# Patient Record
Sex: FEMALE | Race: OTHER | NOT HISPANIC OR LATINO | URBAN - METROPOLITAN AREA
[De-identification: names, ages, dates, MRNs, and addresses within clinical notes are randomized per-mention and may not be internally consistent; named-entity substitution may affect disease eponyms.]

---
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
## 2022-03-23 ENCOUNTER — EMERGENCY (EMERGENCY)
Facility: HOSPITAL | Age: 22
LOS: 1 days | Discharge: HOME | End: 2022-03-23
Attending: EMERGENCY MEDICINE | Admitting: EMERGENCY MEDICINE
Payer: MEDICAID

## 2022-03-23 VITALS
DIASTOLIC BLOOD PRESSURE: 73 MMHG | HEIGHT: 68 IN | RESPIRATION RATE: 18 BRPM | TEMPERATURE: 98 F | SYSTOLIC BLOOD PRESSURE: 109 MMHG | OXYGEN SATURATION: 97 % | WEIGHT: 210.98 LBS | HEART RATE: 88 BPM

## 2022-03-23 DIAGNOSIS — Z98.890 OTHER SPECIFIED POSTPROCEDURAL STATES: Chronic | ICD-10-CM

## 2022-03-23 PROCEDURE — 99285 EMERGENCY DEPT VISIT HI MDM: CPT | Mod: 25

## 2022-03-23 PROCEDURE — 16020 DRESS/DEBRID P-THICK BURN S: CPT
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
## 2022-03-23 NOTE — ED ADULT NURSE NOTE - NSIMPLEMENTINTERV_GEN_ALL_ED
Implemented All Fall with Harm Risk Interventions:  [place] to call system. Call bell, personal items and telephone within reach. Instruct patient to call for assistance. Room bathroom lighting operational. Non-slip footwear when patient is off stretcher. Physically safe environment: no spills, clutter or unnecessary equipment. Stretcher in lowest position, wheels locked, appropriate side rails in place. Provide visual cue, wrist band, yellow gown, etc. Monitor gait and stability. Monitor for mental status changes and reorient to person, place, and time. Review medications for side effects contributing to fall risk. Reinforce activity limits and safety measures with patient and family. Provide visual clues: red socks.

## 2022-03-24 VITALS
SYSTOLIC BLOOD PRESSURE: 106 MMHG | TEMPERATURE: 98 F | RESPIRATION RATE: 16 BRPM | HEART RATE: 78 BPM | DIASTOLIC BLOOD PRESSURE: 62 MMHG | OXYGEN SATURATION: 100 %

## 2022-03-24 LAB
ALBUMIN SERPL ELPH-MCNC: 3.1 G/DL — LOW (ref 3.3–5)
ALP SERPL-CCNC: 68 U/L — SIGNIFICANT CHANGE UP (ref 40–120)
ALT FLD-CCNC: 19 U/L — SIGNIFICANT CHANGE UP (ref 12–78)
ANION GAP SERPL CALC-SCNC: 9 MMOL/L — SIGNIFICANT CHANGE UP (ref 5–17)
AST SERPL-CCNC: 13 U/L — LOW (ref 15–37)
BASOPHILS # BLD AUTO: 0.04 K/UL — SIGNIFICANT CHANGE UP (ref 0–0.2)
BASOPHILS NFR BLD AUTO: 0.4 % — SIGNIFICANT CHANGE UP (ref 0–2)
BILIRUB SERPL-MCNC: 0.2 MG/DL — SIGNIFICANT CHANGE UP (ref 0.2–1.2)
BUN SERPL-MCNC: 11 MG/DL — SIGNIFICANT CHANGE UP (ref 7–23)
CALCIUM SERPL-MCNC: 8.1 MG/DL — LOW (ref 8.5–10.1)
CHLORIDE SERPL-SCNC: 112 MMOL/L — HIGH (ref 96–108)
CK SERPL-CCNC: 152 U/L — SIGNIFICANT CHANGE UP (ref 26–192)
CO2 SERPL-SCNC: 20 MMOL/L — LOW (ref 22–31)
CREAT SERPL-MCNC: 0.75 MG/DL — SIGNIFICANT CHANGE UP (ref 0.5–1.3)
EGFR: 118 ML/MIN/1.73M2 — SIGNIFICANT CHANGE UP
EOSINOPHIL # BLD AUTO: 0.06 K/UL — SIGNIFICANT CHANGE UP (ref 0–0.5)
EOSINOPHIL NFR BLD AUTO: 0.6 % — SIGNIFICANT CHANGE UP (ref 0–6)
GLUCOSE SERPL-MCNC: 87 MG/DL — SIGNIFICANT CHANGE UP (ref 70–99)
HCT VFR BLD CALC: 34.2 % — LOW (ref 34.5–45)
HGB BLD-MCNC: 10.6 G/DL — LOW (ref 11.5–15.5)
IMM GRANULOCYTES NFR BLD AUTO: 0.5 % — SIGNIFICANT CHANGE UP (ref 0–1.5)
LYMPHOCYTES # BLD AUTO: 2.44 K/UL — SIGNIFICANT CHANGE UP (ref 1–3.3)
LYMPHOCYTES # BLD AUTO: 22.6 % — SIGNIFICANT CHANGE UP (ref 13–44)
MCHC RBC-ENTMCNC: 24.1 PG — LOW (ref 27–34)
MCHC RBC-ENTMCNC: 31 GM/DL — LOW (ref 32–36)
MCV RBC AUTO: 77.7 FL — LOW (ref 80–100)
MONOCYTES # BLD AUTO: 1.11 K/UL — HIGH (ref 0–0.9)
MONOCYTES NFR BLD AUTO: 10.3 % — SIGNIFICANT CHANGE UP (ref 2–14)
NEUTROPHILS # BLD AUTO: 7.1 K/UL — SIGNIFICANT CHANGE UP (ref 1.8–7.4)
NEUTROPHILS NFR BLD AUTO: 65.6 % — SIGNIFICANT CHANGE UP (ref 43–77)
NRBC # BLD: 0 /100 WBCS — SIGNIFICANT CHANGE UP (ref 0–0)
PLATELET # BLD AUTO: 242 K/UL — SIGNIFICANT CHANGE UP (ref 150–400)
POTASSIUM SERPL-MCNC: 3.4 MMOL/L — LOW (ref 3.5–5.3)
POTASSIUM SERPL-SCNC: 3.4 MMOL/L — LOW (ref 3.5–5.3)
PROT SERPL-MCNC: 7.4 G/DL — SIGNIFICANT CHANGE UP (ref 6–8.3)
RBC # BLD: 4.4 M/UL — SIGNIFICANT CHANGE UP (ref 3.8–5.2)
RBC # FLD: 15.9 % — HIGH (ref 10.3–14.5)
SODIUM SERPL-SCNC: 141 MMOL/L — SIGNIFICANT CHANGE UP (ref 135–145)
WBC # BLD: 10.8 K/UL — HIGH (ref 3.8–10.5)
WBC # FLD AUTO: 10.8 K/UL — HIGH (ref 3.8–10.5)

## 2022-03-24 PROCEDURE — 70450 CT HEAD/BRAIN W/O DYE: CPT | Mod: ME

## 2022-03-24 PROCEDURE — 82550 ASSAY OF CK (CPK): CPT

## 2022-03-24 PROCEDURE — 85025 COMPLETE CBC W/AUTO DIFF WBC: CPT

## 2022-03-24 PROCEDURE — 99285 EMERGENCY DEPT VISIT HI MDM: CPT | Mod: 25

## 2022-03-24 PROCEDURE — G1004: CPT

## 2022-03-24 PROCEDURE — 96374 THER/PROPH/DIAG INJ IV PUSH: CPT

## 2022-03-24 PROCEDURE — 36415 COLL VENOUS BLD VENIPUNCTURE: CPT

## 2022-03-24 PROCEDURE — 70450 CT HEAD/BRAIN W/O DYE: CPT | Mod: 26,ME

## 2022-03-24 PROCEDURE — 80053 COMPREHEN METABOLIC PANEL: CPT

## 2022-03-24 RX ORDER — SODIUM CHLORIDE 9 MG/ML
1000 INJECTION INTRAMUSCULAR; INTRAVENOUS; SUBCUTANEOUS ONCE
Refills: 0 | Status: COMPLETED | OUTPATIENT
Start: 2022-03-24 | End: 2022-03-24

## 2022-03-24 RX ORDER — KETOROLAC TROMETHAMINE 30 MG/ML
30 SYRINGE (ML) INJECTION ONCE
Refills: 0 | Status: DISCONTINUED | OUTPATIENT
Start: 2022-03-24 | End: 2022-03-24

## 2022-03-24 RX ADMIN — SODIUM CHLORIDE 1000 MILLILITER(S): 9 INJECTION INTRAMUSCULAR; INTRAVENOUS; SUBCUTANEOUS at 00:48

## 2022-03-24 RX ADMIN — Medication 30 MILLIGRAM(S): at 00:55
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
## 2022-03-24 NOTE — ED PROVIDER NOTE - NSFOLLOWUPCLINICS_GEN_ALL_ED_FT
[facility]-Cardiac [facility] Lower Level  Burn  [address] [place] [address]  Phone: [phone]  Fax:

## 2022-03-24 NOTE — ED PROVIDER NOTE - NSFOLLOWUPINSTRUCTIONS_ED_ALL_ED_FT
Second-Degree Burn, Adult       A second-degree burn, also called a partial thickness wound, is a serious injury that affects the first two layers of skin (epidermis and dermis). A second-degree burn may be minor or major, depending on the size and parts of the skin that are burned.      What are the causes?    This condition may be caused by:  •Heat, such as from a flame or hot liquid.      •Radiation, such as from sunlight or radiation treatments.      •Electricity. This can happen when electricity passes through the body, such as from lightning, electrical outlets, or power lines.      •Certain chemicals, such as acids that come into contact with the skin or eyes. Some chemicals can go through clothing.        What increases the risk?    The following factors may make you more likely to develop this condition:  •Being exposed to high-risk environments, such as those with open flames, chemicals, or electricity.      •Having cancer and being treated with radiation.        What are the signs or symptoms?    Symptoms of this condition include:  •Severe pain.      •Changes in the skin. The skin can be deep red, blistered, tender, swollen, blotchy, or shiny.        How is this diagnosed?    This condition is usually diagnosed with a physical exam. Your health care provider may remove any blistered skin during the exam.    It may take several days to diagnose the condition because this kind of burn can take time to develop. Watch the wound for changes at home. Visit a health care provider often to have your wound checked. If the wound is large, you may stay in the hospital so a health care team can examine the wound for a few days.      How is this treated?    Treatment depends on the severity and cause of the burn. Some second-degree burns, including major burns, electrical burns, and chemical burns, may need to be treated in a hospital. Treatment may include:  •Cooling the burn with cool, germ-free (sterile) water.    •Taking or applying medicines, such as:  •Medicines to relieve pain or itching.      •Ointments to treat or prevent infection.      •Antibiotic medicine to treat or prevent infection.        •Getting a tetanus shot.      •Covering the burn with a bandage (dressing).      •Applying pressure dressings to prevent scarring and to keep mobility in the burned part of the body.      •Removing dead skin. This is done by a health care provider. Do not try to remove dead skin yourself.      In deep and large wounds, treatment involves:  •Surgery to remove scabs.      •Being given fluids and nutrition.      •Close monitoring of blood flow near the wound.      •Oxygen given through a mask or a machine (ventilator).        Follow these instructions at home:    Medicines     •Take and apply over-the-counter and prescription medicines and ointments only as told by your health care provider.      •If you were prescribed an antibiotic medicine, take or apply it as told by your health care provider. Do not stop using the antibiotic even if you start to feel better.        Eating and drinking      •Drink enough fluid to keep your urine pale yellow.      •Eat a nutritious diet that is high in protein. This will help your wound heal.        Wound care    •Follow instructions from your health care provider about how to take care of your wound. Make sure you:  •Wash your hands with soap and water for at least 20 seconds before and after you change your dressing. If soap and water are not available, use hand .      •Change your dressing as told by your health care provider.        •If you have a compression dressing, wear it as told by your health care provider.    •Clean your wound 2 times a day, or as often as told by your health care provider.  •Wash the wound with mild soap and water.      •Rinse the wound with water to remove all soap.      •Pat the wound dry with a clean towel. Do not rub.      •Check your wound every day for signs of infection. Check for:  •More redness, swelling, or pain.      •Fluid or blood.      •Warmth.      •Pus or a bad smell.        • Do not scratch or pick at the wound.      • Do not break any blisters or peel any skin.      •Avoid exposing your wound to the sun.      General instructions     •If possible, raise (elevate) the injured area above the level of your heart while you are sitting or lying down.      •Rest as told by your health care provider. Do not exercise until your health care provider approves.      • Do not take baths, swim, use a hot tub, or do anything that would put your burn underwater until your health care provider approves. Ask your health care provider if you may take showers. You may only be allowed to take sponge baths.    • Do not put ice on your burn. This can cause more damage. Try cooling the burn with:  •Cool water.      •A cold, wet cloth (cold compress).        •Do range-of-motion movements if told by your health care provider.      • Do not use any products that contain nicotine or tobacco, such as cigarettes, e-cigarettes, and chewing tobacco. These can delay healing. If you need help quitting, ask your health care provider.      •Keep all follow-up visits as told by your health care provider. This is important.        How is this prevented?    •Make sure your water heaters are set to 120°F (49°C) or lower.      •Make sure you know how to get out of your home in case of a fire.      •Install smoke alarms in your home. Check them regularly to make sure they are working.        Contact a health care provider if:    •Your symptoms do not improve with treatment.      •Your pain is not relieved with medicine.      •You have more redness, swelling, or pain around your wound.      •You have fluid, blood, pus, or a bad smell coming from the wound.      •Your wound feels warm to the touch.      •You have a fever or chills.        Get help right away if:    •You develop red streaks near the wound.      •You develop severe pain.        Summary    •A second-degree burn is a serious injury that affects the first two layers of skin.      •Clean your wound 2 times a day or as often as told. Check your wound every day for signs of infection.      • Do not scratch or pick at your wound, break blisters, peel skin, or put ice on your burn.      This information is not intended to replace advice given to you by your health care provider. Make sure you discuss any questions you have with your health care provider.      Document Revised: 10/21/2020 Document Reviewed: 10/21/2020

## 2022-03-24 NOTE — ED PROVIDER NOTE - CROS ED NEURO ALL NEG
Called patient regarding plan of care now that urology workup has been completed. Patient had been seen by me in the clinic regarding genitourinary bleeding. In the clinic visit, patient was quite sure that the bleeding was with urination and not vaginally. However pelvic ultrasound had been obtained and they were unable to see endometrial stripe. Urological workup did show a large left nonobstructing stone (12x7mm). She has an upcoming appointment with Dr. Talavera to discuss management options.    We discussed that it's possible the bleeding she saw is from this stone. I again asked if she thought the bleeding was coming from her urine and patient confirms this. Discussed that if the bleeding is coming from her urine, no further gynecological workup is necessary. However, if it's possible the bleeding is coming from her vagina, I would recommend an endometrial biopsy as one of the causes of postmenopausal bleeding is endometrial cancer. Pelvic exam performed in the office was very challenging and an endometrial biopsy would need to be done under anesthesia.  Patient expressed understanding. At this point, she is unsure how she would like to proceed. Her preference would be to not do a biopsy as again she is reasonably sure the blood was from her urine. She would like to check with her insurance company to see if an endometrial biopsy under anesthesia would be covered. Procedure name and CPT code provided.  We also did discuss the possibility that if she would be put under anesthesia for ureteroscopy and she would like an endometrial biopsy, potentially these could be done simultaneously so she would only have to be put under anesthesia once. Patient will consider this option.    For now, patient will continue to weigh her options and will let us know what she would like to do. She does have the phone number for our clinic and I encouraged her to call us if any questions arise.    Roxanne Venegas, DO     - - -
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
## 2022-03-24 NOTE — ED PROVIDER NOTE - PATIENT PORTAL LINK FT
You can access the FollowMyHealth Patient Portal offered by [facility] by registering at the following website: [email or web address]. By joining [employer]’s FollowMyHealth portal, you will also be able to view your health information using other applications (apps) compatible with our system.

## 2022-06-18 ENCOUNTER — EMERGENCY (EMERGENCY)
Facility: HOSPITAL | Age: 22
LOS: 1 days | Discharge: ANOTHER HOSPITAL | End: 2022-06-18
Attending: EMERGENCY MEDICINE | Admitting: EMERGENCY MEDICINE
Payer: MEDICAID

## 2022-06-18 VITALS
TEMPERATURE: 98 F | HEIGHT: 68 IN | HEART RATE: 103 BPM | SYSTOLIC BLOOD PRESSURE: 95 MMHG | DIASTOLIC BLOOD PRESSURE: 69 MMHG | RESPIRATION RATE: 18 BRPM | OXYGEN SATURATION: 97 %

## 2022-06-18 VITALS
DIASTOLIC BLOOD PRESSURE: 64 MMHG | RESPIRATION RATE: 18 BRPM | OXYGEN SATURATION: 99 % | TEMPERATURE: 98 F | SYSTOLIC BLOOD PRESSURE: 118 MMHG | HEART RATE: 69 BPM

## 2022-06-18 DIAGNOSIS — Z98.890 OTHER SPECIFIED POSTPROCEDURAL STATES: Chronic | ICD-10-CM

## 2022-06-18 LAB
ALBUMIN SERPL ELPH-MCNC: 3.4 G/DL — SIGNIFICANT CHANGE UP (ref 3.3–5)
ALP SERPL-CCNC: 69 U/L — SIGNIFICANT CHANGE UP (ref 40–120)
ALT FLD-CCNC: 15 U/L — SIGNIFICANT CHANGE UP (ref 12–78)
ANION GAP SERPL CALC-SCNC: 6 MMOL/L — SIGNIFICANT CHANGE UP (ref 5–17)
AST SERPL-CCNC: 11 U/L — LOW (ref 15–37)
BASOPHILS # BLD AUTO: 0.05 K/UL — SIGNIFICANT CHANGE UP (ref 0–0.2)
BASOPHILS NFR BLD AUTO: 0.6 % — SIGNIFICANT CHANGE UP (ref 0–2)
BILIRUB SERPL-MCNC: 0.2 MG/DL — SIGNIFICANT CHANGE UP (ref 0.2–1.2)
BUN SERPL-MCNC: 13 MG/DL — SIGNIFICANT CHANGE UP (ref 7–23)
CALCIUM SERPL-MCNC: 8.7 MG/DL — SIGNIFICANT CHANGE UP (ref 8.5–10.1)
CHLORIDE SERPL-SCNC: 110 MMOL/L — HIGH (ref 96–108)
CK SERPL-CCNC: 172 U/L — SIGNIFICANT CHANGE UP (ref 26–192)
CO2 SERPL-SCNC: 24 MMOL/L — SIGNIFICANT CHANGE UP (ref 22–31)
CREAT SERPL-MCNC: 0.72 MG/DL — SIGNIFICANT CHANGE UP (ref 0.5–1.3)
EGFR: 123 ML/MIN/1.73M2 — SIGNIFICANT CHANGE UP
EOSINOPHIL # BLD AUTO: 0.07 K/UL — SIGNIFICANT CHANGE UP (ref 0–0.5)
EOSINOPHIL NFR BLD AUTO: 0.8 % — SIGNIFICANT CHANGE UP (ref 0–6)
FLUAV AG NPH QL: SIGNIFICANT CHANGE UP
FLUBV AG NPH QL: SIGNIFICANT CHANGE UP
GLUCOSE SERPL-MCNC: 95 MG/DL — SIGNIFICANT CHANGE UP (ref 70–99)
HCT VFR BLD CALC: 35.4 % — SIGNIFICANT CHANGE UP (ref 34.5–45)
HGB BLD-MCNC: 10.5 G/DL — LOW (ref 11.5–15.5)
IMM GRANULOCYTES NFR BLD AUTO: 0.3 % — SIGNIFICANT CHANGE UP (ref 0–1.5)
LIDOCAIN IGE QN: 132 U/L — SIGNIFICANT CHANGE UP (ref 73–393)
LYMPHOCYTES # BLD AUTO: 2.77 K/UL — SIGNIFICANT CHANGE UP (ref 1–3.3)
LYMPHOCYTES # BLD AUTO: 30.5 % — SIGNIFICANT CHANGE UP (ref 13–44)
MAGNESIUM SERPL-MCNC: 2.4 MG/DL — SIGNIFICANT CHANGE UP (ref 1.6–2.6)
MCHC RBC-ENTMCNC: 24.4 PG — LOW (ref 27–34)
MCHC RBC-ENTMCNC: 29.7 GM/DL — LOW (ref 32–36)
MCV RBC AUTO: 82.3 FL — SIGNIFICANT CHANGE UP (ref 80–100)
MONOCYTES # BLD AUTO: 0.74 K/UL — SIGNIFICANT CHANGE UP (ref 0–0.9)
MONOCYTES NFR BLD AUTO: 8.2 % — SIGNIFICANT CHANGE UP (ref 2–14)
NEUTROPHILS # BLD AUTO: 5.41 K/UL — SIGNIFICANT CHANGE UP (ref 1.8–7.4)
NEUTROPHILS NFR BLD AUTO: 59.6 % — SIGNIFICANT CHANGE UP (ref 43–77)
NRBC # BLD: 0 /100 WBCS — SIGNIFICANT CHANGE UP (ref 0–0)
PHOSPHATE SERPL-MCNC: 2.8 MG/DL — SIGNIFICANT CHANGE UP (ref 2.5–4.5)
PLATELET # BLD AUTO: 318 K/UL — SIGNIFICANT CHANGE UP (ref 150–400)
POTASSIUM SERPL-MCNC: 4 MMOL/L — SIGNIFICANT CHANGE UP (ref 3.5–5.3)
POTASSIUM SERPL-SCNC: 4 MMOL/L — SIGNIFICANT CHANGE UP (ref 3.5–5.3)
PROT SERPL-MCNC: 8.1 G/DL — SIGNIFICANT CHANGE UP (ref 6–8.3)
RBC # BLD: 4.3 M/UL — SIGNIFICANT CHANGE UP (ref 3.8–5.2)
RBC # FLD: 14.4 % — SIGNIFICANT CHANGE UP (ref 10.3–14.5)
RSV RNA NPH QL NAA+NON-PROBE: SIGNIFICANT CHANGE UP
SARS-COV-2 RNA SPEC QL NAA+PROBE: SIGNIFICANT CHANGE UP
SODIUM SERPL-SCNC: 140 MMOL/L — SIGNIFICANT CHANGE UP (ref 135–145)
WBC # BLD: 9.07 K/UL — SIGNIFICANT CHANGE UP (ref 3.8–10.5)
WBC # FLD AUTO: 9.07 K/UL — SIGNIFICANT CHANGE UP (ref 3.8–10.5)

## 2022-06-18 PROCEDURE — 96376 TX/PRO/DX INJ SAME DRUG ADON: CPT

## 2022-06-18 PROCEDURE — 83690 ASSAY OF LIPASE: CPT

## 2022-06-18 PROCEDURE — 87637 SARSCOV2&INF A&B&RSV AMP PRB: CPT

## 2022-06-18 PROCEDURE — 82550 ASSAY OF CK (CPK): CPT

## 2022-06-18 PROCEDURE — 80203 DRUG SCREEN QUANT ZONISAMIDE: CPT

## 2022-06-18 PROCEDURE — 96374 THER/PROPH/DIAG INJ IV PUSH: CPT

## 2022-06-18 PROCEDURE — 96375 TX/PRO/DX INJ NEW DRUG ADDON: CPT

## 2022-06-18 PROCEDURE — 83735 ASSAY OF MAGNESIUM: CPT

## 2022-06-18 PROCEDURE — C9254: CPT

## 2022-06-18 PROCEDURE — 36415 COLL VENOUS BLD VENIPUNCTURE: CPT

## 2022-06-18 PROCEDURE — 84100 ASSAY OF PHOSPHORUS: CPT

## 2022-06-18 PROCEDURE — 99291 CRITICAL CARE FIRST HOUR: CPT

## 2022-06-18 PROCEDURE — 93005 ELECTROCARDIOGRAM TRACING: CPT

## 2022-06-18 PROCEDURE — 85025 COMPLETE CBC W/AUTO DIFF WBC: CPT

## 2022-06-18 PROCEDURE — 80177 DRUG SCRN QUAN LEVETIRACETAM: CPT

## 2022-06-18 PROCEDURE — 80053 COMPREHEN METABOLIC PANEL: CPT

## 2022-06-18 PROCEDURE — 93010 ELECTROCARDIOGRAM REPORT: CPT

## 2022-06-18 PROCEDURE — 99285 EMERGENCY DEPT VISIT HI MDM: CPT | Mod: 25

## 2022-06-18 RX ORDER — ZONISAMIDE 100 MG
300 CAPSULE ORAL ONCE
Refills: 0 | Status: COMPLETED | OUTPATIENT
Start: 2022-06-18 | End: 2022-06-18

## 2022-06-18 RX ORDER — LACOSAMIDE 50 MG/1
100 TABLET ORAL ONCE
Refills: 0 | Status: DISCONTINUED | OUTPATIENT
Start: 2022-06-18 | End: 2022-06-18

## 2022-06-18 RX ORDER — SODIUM CHLORIDE 9 MG/ML
1000 INJECTION INTRAMUSCULAR; INTRAVENOUS; SUBCUTANEOUS ONCE
Refills: 0 | Status: COMPLETED | OUTPATIENT
Start: 2022-06-18 | End: 2022-06-18

## 2022-06-18 RX ORDER — CLONAZEPAM 1 MG
2 TABLET ORAL ONCE
Refills: 0 | Status: DISCONTINUED | OUTPATIENT
Start: 2022-06-18 | End: 2022-06-18

## 2022-06-18 RX ORDER — LEVETIRACETAM 250 MG/1
1000 TABLET, FILM COATED ORAL ONCE
Refills: 0 | Status: DISCONTINUED | OUTPATIENT
Start: 2022-06-18 | End: 2022-06-18

## 2022-06-18 RX ORDER — DIAZEPAM 5 MG
5 TABLET ORAL ONCE
Refills: 0 | Status: DISCONTINUED | OUTPATIENT
Start: 2022-06-18 | End: 2022-06-18

## 2022-06-18 RX ORDER — LEVETIRACETAM 250 MG/1
1000 TABLET, FILM COATED ORAL ONCE
Refills: 0 | Status: COMPLETED | OUTPATIENT
Start: 2022-06-18 | End: 2022-06-18

## 2022-06-18 RX ORDER — ONDANSETRON 8 MG/1
4 TABLET, FILM COATED ORAL ONCE
Refills: 0 | Status: COMPLETED | OUTPATIENT
Start: 2022-06-18 | End: 2022-06-18

## 2022-06-18 RX ORDER — CLONAZEPAM 1 MG
1.5 TABLET ORAL ONCE
Refills: 0 | Status: DISCONTINUED | OUTPATIENT
Start: 2022-06-18 | End: 2022-06-18

## 2022-06-18 RX ADMIN — LEVETIRACETAM 440 MILLIGRAM(S): 250 TABLET, FILM COATED ORAL at 17:10

## 2022-06-18 RX ADMIN — ONDANSETRON 4 MILLIGRAM(S): 8 TABLET, FILM COATED ORAL at 16:53

## 2022-06-18 RX ADMIN — LEVETIRACETAM 1000 MILLIGRAM(S): 250 TABLET, FILM COATED ORAL at 16:55

## 2022-06-18 RX ADMIN — SODIUM CHLORIDE 1000 MILLILITER(S): 9 INJECTION INTRAMUSCULAR; INTRAVENOUS; SUBCUTANEOUS at 16:45

## 2022-06-18 RX ADMIN — Medication 2 MILLIGRAM(S): at 16:46

## 2022-06-18 RX ADMIN — Medication 5 MILLIGRAM(S): at 17:49

## 2022-06-18 RX ADMIN — LEVETIRACETAM 440 MILLIGRAM(S): 250 TABLET, FILM COATED ORAL at 17:49

## 2022-06-18 RX ADMIN — Medication 300 MILLIGRAM(S): at 18:21

## 2022-06-18 RX ADMIN — LACOSAMIDE 120 MILLIGRAM(S): 50 TABLET ORAL at 21:37

## 2022-06-18 RX ADMIN — Medication 2 MILLIGRAM(S): at 22:01

## 2022-06-18 RX ADMIN — Medication 2 MILLIGRAM(S): at 15:59

## 2022-06-18 RX ADMIN — Medication 2 MILLIGRAM(S): at 17:18

## 2022-06-18 RX ADMIN — Medication 2 MILLIGRAM(S): at 21:38

## 2022-06-18 RX ADMIN — LACOSAMIDE 120 MILLIGRAM(S): 50 TABLET ORAL at 18:42

## 2022-06-18 RX ADMIN — Medication 1.5 MILLIGRAM(S): at 18:03
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
## 2022-06-18 NOTE — ED PROVIDER NOTE - NSBENEFITOFTRANSFER_ED_A_ED
Obtain Level of Care/Service Not Available at this Facility/Worsening of Condition, Death, or Disability if Patient Does Not Transfer/Continuity of Care at Other Facility/Other:
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
## 2022-06-18 NOTE — ED PROVIDER NOTE - PROGRESS NOTE DETAILS
pt presenting with tonic clonic seizure activity and frothing at the mouth Ativan 2 mg given with resolution of seizure maintaining airway pt presenting with tonic clonic seizure activity and frothing at the mouth additional Ativan 2 mg given with resolution of seizure maintaining airway Due to recurrent seizure will load with Keppra 1 gm pt also reporting nausea will treat with Zofran awaiting labs As pt has had multiple episodes of tonic clonic seizure activity in the ED Valium ordered for longer acting control. Spoke with Neurology in house recommends additional Keppra 1 gm as pt would take this normally at 6 pm. Pt remains awake but lethargic maintaining airway Pt additional seizure medication given Pt will require admission due to recurrent seizure activity as there is no in house EEG would benefit from transfer at this time spoke with physician covering for Dr. Yanez in agreement with current plan can give additional Vimpat if needed although pt would need EKG pre and post administering. In agreement that pt should be admitted. Transfer center at [place] notified. Spoke with Dr. Duffy in the ED will accept pt family updated pt has additional seizure lasting less then 3 min, awake at this time. Pt 10 pm medications ordered. Was going to dose pt another 2 mg of Ativan but family would like to hold to await ambulance arrival. Will give additional Vimpat as per pt neuro recommendations. Repeat EKG ordered. Of note pt had c/o some numbness to her RLE which has since resolved. +pedal pulse

## 2022-06-21 LAB — ZONISAMIDE SERPL-MCNC: 40 UG/ML — SIGNIFICANT CHANGE UP (ref 10–40)

## 2022-06-24 LAB — LEVETIRACETAM SERPL-MCNC: 25.2 UG/ML — SIGNIFICANT CHANGE UP (ref 10–40)

## 2022-06-26 NOTE — ED ADULT NURSE REASSESSMENT NOTE - NS ED NURSE REASSESS COMMENT FT1
patient sitting up in stretcher bedside cardiac monitor intact, patient maintaining saturation, seizure precautions being maintained, no seizure activity noted at this time, IV site dry and intact no sings of infiltration noted at this time, report given to [place] receiving RN, patient condition provided, hx and medication adm, patient resting at this time will continue to monitor.
You can access the FollowMyHealth Patient Portal offered by  by registering at the following website: [email or web address]. By joining [employer]’s FollowMyHealth portal, you will also be able to view your health information using other applications (apps) compatible with our system.

## 2022-07-03 ENCOUNTER — EMERGENCY (EMERGENCY)
Facility: HOSPITAL | Age: 22
LOS: 1 days | Discharge: ANOTHER HOSPITAL | End: 2022-07-03
Attending: INTERNAL MEDICINE | Admitting: INTERNAL MEDICINE
Payer: MEDICAID

## 2022-07-03 VITALS
TEMPERATURE: 98 F | OXYGEN SATURATION: 99 % | RESPIRATION RATE: 16 BRPM | HEART RATE: 74 BPM | HEIGHT: 68 IN | SYSTOLIC BLOOD PRESSURE: 119 MMHG | DIASTOLIC BLOOD PRESSURE: 75 MMHG

## 2022-07-03 VITALS
TEMPERATURE: 98 F | HEART RATE: 75 BPM | SYSTOLIC BLOOD PRESSURE: 100 MMHG | DIASTOLIC BLOOD PRESSURE: 55 MMHG | OXYGEN SATURATION: 100 % | RESPIRATION RATE: 14 BRPM

## 2022-07-03 DIAGNOSIS — Z98.890 OTHER SPECIFIED POSTPROCEDURAL STATES: Chronic | ICD-10-CM

## 2022-07-03 LAB
ALBUMIN SERPL ELPH-MCNC: 3.2 G/DL — LOW (ref 3.3–5)
ALP SERPL-CCNC: 65 U/L — SIGNIFICANT CHANGE UP (ref 40–120)
ALT FLD-CCNC: 13 U/L — SIGNIFICANT CHANGE UP (ref 12–78)
ANION GAP SERPL CALC-SCNC: 5 MMOL/L — SIGNIFICANT CHANGE UP (ref 5–17)
AST SERPL-CCNC: 14 U/L — LOW (ref 15–37)
BILIRUB SERPL-MCNC: <0.1 MG/DL — LOW (ref 0.2–1.2)
BUN SERPL-MCNC: 16 MG/DL — SIGNIFICANT CHANGE UP (ref 7–23)
CALCIUM SERPL-MCNC: 8.3 MG/DL — LOW (ref 8.5–10.1)
CHLORIDE SERPL-SCNC: 116 MMOL/L — HIGH (ref 96–108)
CO2 SERPL-SCNC: 21 MMOL/L — LOW (ref 22–31)
CREAT SERPL-MCNC: 0.73 MG/DL — SIGNIFICANT CHANGE UP (ref 0.5–1.3)
EGFR: 121 ML/MIN/1.73M2 — SIGNIFICANT CHANGE UP
GLUCOSE SERPL-MCNC: 103 MG/DL — HIGH (ref 70–99)
HCT VFR BLD CALC: 34.4 % — LOW (ref 34.5–45)
HGB BLD-MCNC: 10.4 G/DL — LOW (ref 11.5–15.5)
MAGNESIUM SERPL-MCNC: 1.8 MG/DL — SIGNIFICANT CHANGE UP (ref 1.6–2.6)
MCHC RBC-ENTMCNC: 24.5 PG — LOW (ref 27–34)
MCHC RBC-ENTMCNC: 30.2 GM/DL — LOW (ref 32–36)
MCV RBC AUTO: 80.9 FL — SIGNIFICANT CHANGE UP (ref 80–100)
NRBC # BLD: 0 /100 WBCS — SIGNIFICANT CHANGE UP (ref 0–0)
PHOSPHATE SERPL-MCNC: 3.1 MG/DL — SIGNIFICANT CHANGE UP (ref 2.5–4.5)
PLATELET # BLD AUTO: 327 K/UL — SIGNIFICANT CHANGE UP (ref 150–400)
POTASSIUM SERPL-MCNC: 3.8 MMOL/L — SIGNIFICANT CHANGE UP (ref 3.5–5.3)
POTASSIUM SERPL-SCNC: 3.8 MMOL/L — SIGNIFICANT CHANGE UP (ref 3.5–5.3)
PROT SERPL-MCNC: 7.9 G/DL — SIGNIFICANT CHANGE UP (ref 6–8.3)
RBC # BLD: 4.25 M/UL — SIGNIFICANT CHANGE UP (ref 3.8–5.2)
RBC # FLD: 15 % — HIGH (ref 10.3–14.5)
SARS-COV-2 RNA SPEC QL NAA+PROBE: SIGNIFICANT CHANGE UP
SODIUM SERPL-SCNC: 142 MMOL/L — SIGNIFICANT CHANGE UP (ref 135–145)
WBC # BLD: 10.18 K/UL — SIGNIFICANT CHANGE UP (ref 3.8–10.5)
WBC # FLD AUTO: 10.18 K/UL — SIGNIFICANT CHANGE UP (ref 3.8–10.5)

## 2022-07-03 PROCEDURE — 93005 ELECTROCARDIOGRAM TRACING: CPT

## 2022-07-03 PROCEDURE — 93010 ELECTROCARDIOGRAM REPORT: CPT

## 2022-07-03 PROCEDURE — 85027 COMPLETE CBC AUTOMATED: CPT

## 2022-07-03 PROCEDURE — 80053 COMPREHEN METABOLIC PANEL: CPT

## 2022-07-03 PROCEDURE — 87635 SARS-COV-2 COVID-19 AMP PRB: CPT

## 2022-07-03 PROCEDURE — 84100 ASSAY OF PHOSPHORUS: CPT

## 2022-07-03 PROCEDURE — 99291 CRITICAL CARE FIRST HOUR: CPT

## 2022-07-03 PROCEDURE — 80177 DRUG SCRN QUAN LEVETIRACETAM: CPT

## 2022-07-03 PROCEDURE — 99285 EMERGENCY DEPT VISIT HI MDM: CPT | Mod: 25

## 2022-07-03 PROCEDURE — 83735 ASSAY OF MAGNESIUM: CPT

## 2022-07-03 PROCEDURE — 36415 COLL VENOUS BLD VENIPUNCTURE: CPT

## 2022-07-03 RX ORDER — LEVETIRACETAM 250 MG/1
1000 TABLET, FILM COATED ORAL ONCE
Refills: 0 | Status: DISCONTINUED | OUTPATIENT
Start: 2022-07-03 | End: 2022-07-03

## 2022-07-03 RX ORDER — SODIUM CHLORIDE 9 MG/ML
1000 INJECTION INTRAMUSCULAR; INTRAVENOUS; SUBCUTANEOUS ONCE
Refills: 0 | Status: COMPLETED | OUTPATIENT
Start: 2022-07-03 | End: 2022-07-03

## 2022-07-03 RX ADMIN — Medication 1 MILLIGRAM(S): at 01:53

## 2022-07-03 RX ADMIN — LEVETIRACETAM 1000 MILLIGRAM(S): 250 TABLET, FILM COATED ORAL at 02:20

## 2022-07-03 RX ADMIN — SODIUM CHLORIDE 1000 MILLILITER(S): 9 INJECTION INTRAMUSCULAR; INTRAVENOUS; SUBCUTANEOUS at 01:11

## 2022-07-03 RX ADMIN — Medication 1 MILLIGRAM(S): at 02:21

## 2022-07-03 NOTE — ED PROVIDER NOTE - OBJECTIVE STATEMENT
per ems from home with 9 seizures today per family, lasting about 30 seconds to 1 minute each, hx of seizures, fs 98 by ems  seizures per ems from home with 9 seizures today per family, lasting about 30 seconds to 1 minute each, hx of seizures, fs 98 by ems  seizures  generalized seizure in the ED at 1:45 Rx ativan and keppra 1000mg 22 year old female H/O seizure disorder per ems from home with 9 seizures today per family, lasting about 30 seconds to 1 minute each, hx of seizures, fs 98   In the ED she had a prolonged generalized seizure  at 1:45 am that lasted a few minutes and eventually broke with  Rx IV  ativan 1 mg,  Keppra 1000mg and a second ativan 1mg. She was post ictal. after transfer and discussion with Gunnar Mcgee MD at [facility] the patient was A/O x 3 and back to base line. She is scheduled at [facility] on Jul 11 for EEG

## 2022-07-03 NOTE — ED PROVIDER NOTE - PROGRESS NOTE DETAILS
transfer to [place] initiated DW with Gunnar Mcgee at [facility] the patients grandmother and mother   DW with Gunnar Mcgee at [facility]

## 2022-07-03 NOTE — ED PROVIDER NOTE - CLINICAL SUMMARY MEDICAL DECISION MAKING FREE TEXT BOX
status epilepticus  treated with IV ativan, IV keppra, discussed with Dr Gunnar Mcgee and transferred to [facility]

## 2022-07-03 NOTE — ED ADULT NURSE REASSESSMENT NOTE - NS ED NURSE REASSESS COMMENT FT1
pt had 3 seizures while in the dept.  pt given ativan IVP x2 and IV keppra 1000mg.  pt reports feeling better, no  longer post ictal.  pt awake and talking.  mother at bedside.  pt assisted oob to bedside commode without incident.  awaiting transfer to [facility].

## 2022-07-03 NOTE — ED ADULT NURSE NOTE - NSIMPLEMENTINTERV_GEN_ALL_ED
Implemented All Fall Risk Interventions:  [place] to call system. Call bell, personal items and telephone within reach. Instruct patient to call for assistance. Room bathroom lighting operational. Non-slip footwear when patient is off stretcher. Physically safe environment: no spills, clutter or unnecessary equipment. Stretcher in lowest position, wheels locked, appropriate side rails in place. Provide visual cue, wrist band, yellow gown, etc. Monitor gait and stability. Monitor for mental status changes and reorient to person, place, and time. Review medications for side effects contributing to fall risk. Reinforce activity limits and safety measures with patient and family.
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
## 2022-07-07 LAB — LEVETIRACETAM SERPL-MCNC: 33.2 UG/ML — SIGNIFICANT CHANGE UP (ref 10–40)

## 2022-07-26 RX ORDER — ZONISAMIDE 100 MG
3 CAPSULE ORAL
Qty: 0 | Refills: 0 | DISCHARGE

## 2022-07-26 RX ORDER — FOLIC ACID 0.8 MG
1 TABLET ORAL
Qty: 0 | Refills: 0 | DISCHARGE

## 2022-07-26 RX ORDER — LACOSAMIDE 50 MG/1
1 TABLET ORAL
Qty: 0 | Refills: 0 | DISCHARGE

## 2022-07-26 RX ORDER — LEVETIRACETAM 250 MG/1
0 TABLET, FILM COATED ORAL
Qty: 0 | Refills: 0 | DISCHARGE

## 2022-07-26 RX ORDER — PERAMPANEL 2 MG/1
1 TABLET ORAL
Qty: 0 | Refills: 0 | DISCHARGE

## 2022-07-26 RX ORDER — CLONAZEPAM 1 MG
0 TABLET ORAL
Qty: 0 | Refills: 0 | DISCHARGE

## 2022-07-27 PROBLEM — G40.909 EPILEPSY, UNSPECIFIED, NOT INTRACTABLE, WITHOUT STATUS EPILEPTICUS: Chronic | Status: ACTIVE | Noted: 2017-07-09

## 2022-11-11 NOTE — ED ADULT NURSE NOTE - BEFAST LAST WELL KNOWN
[facility] PROGRESS NOTE      Patient:  Amy Lion Date of Service:  11/11/2022   YOB: 1956 Admission Date:  11/4/2022   MRN:  [id number] Attending:  Isaiah Luna MD   PCP:  [facility] Pcp   Hospital Day:  Hospital Day: 8     PRIMARY CARDIOLOGIST:  Alejandra    CHIEF COMPLAINT: UTI, NSTEMI      HISTORY OF PRESENT ILLNESS:   Amy Lion is a 66 year old female with history of HFpEF, morbid obesity, chronic debility, PVD, HTN, T2DM, depression, HTN, and HLD who was transferred to [facility] from [place] for possible coronary angiogram after trop were found to be elevated. Pt reportedly presented to OSH on 11/3/22 for confusion and fall from her nursing facility. She was found to have sepsis from UTI. Trop level were also noted to be elevated and thus she was transferred to [facility] for further eval.     Pt was seen at bedside. She appeared sleepy but was able to answer my questions. She reports feeling weak but denies CP, SOB, abd pain, N/V/D, fever, or chills. She is bed-ridden for the majority of the time and ambulates minimally using a walker. Denies any cardiac history but admits to family hx of heart disease in parents at old age.     Interval Hx:  Patient diuresing on lasix gtt. Net I/O since admission -11.1 L, -1.2 L in the pats 24 hours. Bed weights 415->387 lbs, awaiting today's weight. Creatinine 0.72, K 3.5, Bicarb 33, Na 140. Hgb 10.2. SBP 130s. Saturating well on RA.    Today, patient reports feeling okay. Reports breathing is okay. Denies chest pain. Denies pain in legs.    CURRENT AND PREVIOUS CARDIAC STUDIES:     EKG:   Date:  11/3/22      Echo 11/4/22  Final Impressions:  Limited echocardiogram.  Preserved systolic function with mild distal septal lateral wall hypokinesis.  EF 54%.  Moderate increased left ventricular wall thickness.  Grade I diastolic dysfunction of the left ventricle (impaired relaxation pattern).  Calcified AV without significant stenosis.  Thickened mitral  valve.  Dilated IVC.  No pericardial effusion    Echo 9/27/2022  Normal left ventricular size and systolic function, EF 60 %.  Grade I diastolic dysfunction of the left ventricle (impaired relaxation pattern).  Normal right ventricular size and systolic function.  Mild pulmonary hypertension; RVSP 40 mmHg.  Mild aortic valve stenosis; mean gradient 12 mmHg, KEKE 1.5 cm2.  Normal IVC dimension with >50% respiratory change of the inferior vena cava.  No pericardial effusion.    REVIEW OF SYSTEMS:   See HPI    PAST HISTORY:     PAST MEDICAL HISTORY:   Past Medical History:   Diagnosis Date   • Anxiety Disorder    • Bacteremia    • Cellulitis 09/24/2022    abd   • Chronic back pain    • Congestive cardiac failure (CMS/[facility])    • CVA (cerebral infarction)    • Depression    • Diabetes Mellitus    • Erythema intertrigo    • Gastroesophageal reflux disease    • Glaucoma     severe left eye   • Herpes zoster    • Hyperlipidemia    • Hypertension    • Hypovolemia 09/24/2022   • Left ulnar fracture    • Lymphedema of both lower extremities 02/04/2020   • Morbid (severe) obesity due to excess calories (CMS/[facility])    • Neuropathy     feet   • OA (osteoarthritis) of hip    • Obesity    • Polyneuropathy    • PVD (peripheral vascular disease) (CMS/[facility])    • unspecified dementia      Patient Active Problem List    Diagnosis Date Noted   • UTI (urinary tract infection) 11/04/2022     Priority: Low   • NSTEMI (non-ST elevated myocardial infarction) (CMS/[facility]) 11/04/2022     Priority: Low   • Severe sepsis (CMS/[facility]) 11/03/2022     Priority: Low   • Cellulitis of other specified site 09/24/2022     Priority: Low   • Cellulitis 09/24/2022     Priority: Low   • Microalbuminuria 02/24/2022     Priority: Low   • History of methicillin resistant staphylococcus aureus (MRSA) 02/24/2022     Priority: Low   • Dementia without behavioral disturbance (CMS/[facility]) 01/28/2022     Priority: Low   • Impaired mobility and activities of daily living  01/28/2022     Priority: Low   • Resides in long term care facility 01/30/2021     Priority: Low   • PAD (peripheral artery disease) (CMS/HCC) 02/04/2020     Priority: Low   • Lymphedema of both lower extremities 02/04/2020     Priority: Low   • Diabetic retinopathy of both eyes associated with type 2 diabetes mellitus (CMS/HCC) 09/05/2019     Priority: Low   • Gastroesophageal reflux disease without esophagitis 03/02/2018     Priority: Low   • Neovascular glaucoma of left eye, severe stage 11/20/2015     Priority: Low   • History of cardioembolic cerebrovascular accident (CVA) 09/23/2015     Priority: Low   • Essential hypertension      Priority: Low   • Dyslipidemia      Priority: Low   • BMI 60.0-69.9, adult (CMS/HCC)      Priority: Low   • Generalized OA 05/07/2013     Priority: Low     Left knee shoulders     • Type 2 diabetes mellitus with diabetic polyneuropathy, with long-term current use of insulin (CMS/HCC) 05/07/2013     Priority: Low       HOME MEDICATIONS:  Medications Prior to Admission   Medication Sig Dispense Refill   • divalproex (DEPAKOTE SPRINKLE) 125 MG sprinkle Take 125 mg by mouth in the morning and 125 mg at noon and 125 mg in the evening.     • DISPENSE Eucerin (White petrolatum-mineral oil) cream Apply thin layer topical every shift PRN     • acetaminophen (TYLENOL) 650 MG CR tablet Take 650 mg by mouth every 8 hours as needed for Pain.     • Acidophilus Lactobacillus Cap Take 2 capsules by mouth daily.     • albuterol 108 (90 Base) MCG/ACT inhaler Inhale 2 puffs into the lungs every 4 hours as needed for Shortness of Breath or Wheezing.     • lidocaine (LIDOCARE) 4 % patch Place 1 patch onto the skin every 12 hours as needed for Pain.     • clopidogrel (Plavix) 75 MG tablet Take 1 tablet by mouth daily. 30 tablet 0   • acetaminophen (TYLENOL) 650 MG suppository Place 650 mg rectally every 4 hours as needed for Fever.     • lidocaine (LIDOCARE) 4 % patch Place 1 patch onto the skin every 24  hours as needed for Pain.     • Menthol, Topical Analgesic, (Biofreeze) 4 % Gel Apply topically 2 times daily as needed (for pain).     • docusate sodium (COLACE) 100 MG capsule Take 100 mg by mouth daily.     • gabapentin (NEURONTIN) 100 MG capsule Take 100 mg by mouth in the morning and 100 mg in the evening.     • GLUCAGON HCL, RDNA, IJ Inject 1 mg as directed 1 time as needed (for hypoglycemia).     • dextrose (GLUTOSE) 40 % Gel Take 1 Tube by mouth 1 time as needed for Hypoglycemia.     • aluminum-magnesium hydroxide-simethicone (MAALOX) 200-200-20 MG/5ML Suspension Take 15 mLs by mouth every 4 hours as needed (for indigestion).     • miconazole 2 % topical powder Apply 1 application topically 2 times daily as needed for Itching.     • Multiple Vitamins-Iron (Multivitamin Plus Iron Adult) Tab Take 1 tablet by mouth daily.     • Benzocaine (ORAJEL MT) Take 1 application by mouth 4 times daily as needed (for pain). (benzocaine-menthol-zinc-chlor) 20-0.26-0.15% gel     • docusate sodium-sennosides (SENOKOT S) 50-8.6 MG per tablet Take 1 tablet by mouth daily.     • acetaminophen (TYLENOL) 650 MG CR tablet Take 650 mg by mouth in the morning and 650 mg at noon and 650 mg in the evening.     • insulin glargine (LANTUS) 100 UNIT/ML vial solution Inject 40 units subcutaneously in the morning and 62 units in the evening     • metFORMIN (GLUCOPHAGE-XR) 750 MG 24 hr tablet Take 750 mg by mouth daily (with breakfast). Indications: Type 2 Diabetes     • acetaminophen (TYLENOL) 325 MG tablet Take 650 mg by mouth every 4 hours as needed for Pain or Fever.     • bisacodyl (DULCOLAX) 10 MG suppository Place 10 mg rectally daily as needed for Constipation.     • insulin lispro (HUMALOG) 100 UNIT/ML sliding scale injection Less than 150 Give No Insulin 150-200 Give 6 units insulin, 201-250 Give 8 units insulin, 251-300 Give 10 units insulin SubQ 301-350 Give 12 units insulin SubQ 351-400 Give 14 units insulin SubQ Greater than  400. Max: 56 units 10 mL 0   • amLODIPine (NORVASC) 5 MG tablet Take 1 tablet by mouth daily. Do not start before May 27, 2020. 30 tablet 0   • atorvastatin (LIPITOR) 80 MG tablet Take 1 tablet by mouth once daily (Patient taking differently: Take 80 mg by mouth nightly.) 90 tablet 0   • famotidine (PEPCID) 20 MG tablet Take 1 tablet by mouth daily. 90 tablet 3   • aspirin 81 MG tablet Take 81 mg by mouth daily.         ALLERGIES  ALLERGIES:   Allergen Reactions   • Sulfa Antibiotics RASH     Skin turns pink   • Cat Dander    • Dust    • Seasonal        FAMILY HISTORY:  family history includes Cancer in her father and mother; Diabetes in her mother; Hyperlipidemia in her brother; Hypertension in her brother and father; Other in her brother and mother.    SOCIAL HISTORY:   reports that she quit smoking about 34 years ago. She has never used smokeless tobacco. She reports that she does not drink alcohol and does not use drugs.      PHYSICAL EXAM:   Blood pressure 131/60, pulse 91, temperature 98.6 °F (37 °C), temperature source Oral, resp. rate 18, height 5' 4\" (1.626 m), weight (!) 175.6 kg (387 lb 2 oz), SpO2 92 %.    Intake/Output Summary (Last 24 hours) at 11/11/2022 0842  Last data filed at 11/11/2022 0804  Gross per 24 hour   Intake 425.4 ml   Output 1775 ml   Net -1349.6 ml     PHYSICAL EXAM:  General:  Awake, alert and oriented and in no acute distress. Morbid obese.   Eyes:  No xanthelasma, anicteric sclerae.  Cardiovascular system:  RRR. Grade II/VI systolic murmur at RUSB. Normal S1 and S2. No JVD or HJR note, though difficult to assess due to body habitus  Respiratory:  Normal resp effort. Auscultation was limited due to body habitus but clear throughout bilateral anterior fields.  Abdomen:  Soft, nontender.   Extremities:  +2-3 B/ L LE pitting pedal and lower extremity edema noted. Bilateral legs are wrapped.   Psychiatric:  Flat affect.   Skin:  Warm and dry, LLE wound with surrounding erythema -  currently dressed/wrapped as above  Neurological:   Bilateral sensory and motor function grossly normal.  Musculoskeletal:  No joint pain and no visual signs of joint inflammation.     CURRENT MEDICATIONS:   Scheduled:  Current Facility-Administered Medications   Medication Dose Route Frequency Provider Last Rate Last Admin   • insulin glargine (LANTUS) injection 15 Units  15 Units Subcutaneous Daily Forrest Wong DO   15 Units at 11/10/22 1019    And   • insulin glargine (LANTUS) injection 15 Units  15 Units Subcutaneous Nightly Forrest Wong DO   15 Units at 11/10/22 2024   • [Held by provider] pantoprazole (PROTONIX) EC tablet 40 mg  40 mg Oral QAM AC Forrest Wong DO       • linezolid (ZYVOX) tablet 600 mg  600 mg Oral Q8H Johnson Gannon MD   600 mg at 11/10/22 2020   • metoPROLOL succinate (TOPROL-XL) ER tablet 25 mg  25 mg Oral Daily Rebeca Shelley DO   25 mg at 11/10/22 1011   • clopidogrel (PLAVIX) tablet 75 mg  75 mg Oral Daily Rebeca Shelley DO   75 mg at 11/10/22 1009   • amLODIPine (NORVASC) tablet 5 mg  5 mg Oral Daily Forrest Wong DO   5 mg at 11/10/22 1009   • nystatin (MYCOSTATIN) powder   Topical TID Forrest Wong DO   Given at 11/10/22 2020   • empagliflozin (JARDIANCE) tablet 10 mg  10 mg Oral Daily Rebeca Shelley DO   10 mg at 11/10/22 1020   • spironolactone (ALDACTONE) tablet 25 mg  25 mg Oral Daily Rebeca Shelley DO   25 mg at 11/10/22 1009   • sodium chloride (PF) 0.9 % injection 2 mL  2 mL Intracatheter 2 times per day Juan Guerrier PA-C   2 mL at 11/10/22 2020   • insulin lispro (ADMELOG,HumaLOG) - Correction Dose   Subcutaneous TID  Mary Benoit DO   2 Units at 11/06/22 0928   • Potassium Standard Replacement Protocol (Levels 3.5 and lower)   Does not apply See Admin Instructions Mary Benoit DO       • Magnesium Standard Replacement Protocol   Does not apply See Admin Instructions Mary Benoit DO       • aspirin chewable 81 mg  81 mg Oral Daily Mary  Cy, DO   81 mg at 11/10/22 1009   • atorvastatin (LIPITOR) tablet 80 mg  80 mg Oral Nightly Mary Cy, DO   80 mg at 11/10/22 2020       Infusions:  Current Facility-Administered Medications   Medication Dose Route Frequency Provider Last Rate Last Admin   • furosemide (LASIX) 250 mg in NaCl 0.9% 125 mL infusion  5 mg/hr Intravenous Continuous Amjed Zidan, DO 2.5 mL/hr at 11/10/22 2044 5 mg/hr at 11/10/22 2044       PRN:  Current Facility-Administered Medications   Medication Dose Route Frequency Provider Last Rate Last Admin   • sodium chloride 0.9 % flush bag 25 mL  25 mL Intravenous PRN Juan Guerrier, PA-C       • heparin (porcine) injection 4,000 Units  4,000 Units Intravenous PRN Juan Guerrier PA-C       • heparin (porcine) injection 2,000 Units  2,000 Units Intravenous PRN Juan Guerrier, PA-C   2,000 Units at 11/05/22 0611   • ondansetron (ZOFRAN) injection 4 mg  4 mg Intravenous Q6H PRN Juan Guerrier, PA-C       • acetaminophen (TYLENOL) tablet 650 mg  650 mg Oral Q4H PRN Juan Guerrier, PA-C   650 mg at 11/05/22 0902   • dextrose 50 % injection 25 g  25 g Intravenous PRN Juan Guerrier, PA-C       • dextrose 50 % injection 12.5 g  12.5 g Intravenous PRN Juan Guerrier, PA-C       • glucagon (GLUCAGEN) injection 1 mg  1 mg Intramuscular PRN Juan Guerrire, PA-C       • dextrose (GLUTOSE) 40 % gel 15 g  15 g Oral PRN Juan Guerrier, PA-C       • dextrose (GLUTOSE) 40 % gel 30 g  30 g Oral PRN Juan Guerrier, PA-C       • sodium chloride (NORMAL SALINE) 0.9 % bolus 500 mL  500 mL Intravenous PRN Mary Benoit DO       • sodium chloride 0.9 % flush bag 25 mL  25 mL Intravenous PRN Marycipriano Benoit, DO 7.5 mL/hr at 11/10/22 2014 25 mL at 11/10/22 2014   • polyethylene glycol (MIRALAX) packet 17 g  17 g Oral Daily PRN Mary Benoit DO       • docusate sodium-sennosides (SENOKOT S) 50-8.6 MG 2 tablet  2 tablet Oral Daily PRN Mary Benoit DO           LABS:     CARDIAC MARKERS:  No results  found    BNP:  No results found    CBC:    Recent Labs   Lab 11/11/22  0202 11/10/22  0533 11/09/22  0505   WBC 16.3* 17.0* 19.1*   HGB 10.2* 10.2* 10.1*   HCT 31.7* 32.2* 31.4*    361 315       CMP:    Recent Labs   Lab 11/11/22  0202 11/10/22  1254 11/10/22  0533 11/09/22  1236 11/09/22  0505 11/05/22  1900 11/05/22  0416   SODIUM 140  --  141  --  142   < > 140   POTASSIUM 3.5 3.5 3.5   < > 3.4   < > 3.6   CHLORIDE 100  --  101  --  101   < > 103   CO2 33*  --  32  --  33*   < > 28   BUN 14  --  15  --  12   < > 21*   CREATININE 0.72  --  0.69  --  0.71   < > 0.62   GLUCOSE 147*  --  136*  --  99   < > 219*   ALBUMIN 2.1*  --  2.1*  --  2.1*   < > 2.0*   GPT 24  --  20  --  21   < > 19   ALKPT 73  --  81  --  84   < > 84   BILIRUBIN 0.6  --  0.7  --  0.9   < > 0.8   MG  --   --   --   --   --   --  2.2    < > = values in this interval not displayed.       LIPID PANEL:    No results found    HbA1c:    Hemoglobin A1C (%)   Date Value   09/02/2022 5.9 (H)        COAGULATION STUDIES:    Recent Labs   Lab 11/11/22  0202 11/10/22  0533 11/09/22  0504   PTT 28 26 25       BEST PRACTICE BOX     AMI WITH Heart Failure with Reduced LVEF (<40%)?  no  AMI?  No  Heart Failure with Reduced LVEF (< 40%)?  No    ASSESSMENT, PROBLEM LIST, AND PLAN:   Amy Lion is a 66 year old female with history of HFpEF, morbid obesity, chronic debility, PVD, HTN, T2DM, depression, HTN, and HLD who was transferred to [facility] from [facility] for possible coronary angiogram after trop were found to be elevated. Pt reportedly presented to OSH on 11/3/22 for confusion and fall from her nursing facility.     #NSTEMI  -In the setting of sepsis from a urinary source. Trop peaked at 1600.   -EKG showed NSR without acute ischemic changes.  -Echo showed mild distal septal lateral wall hypokinesis but preserved EF.   -Cardiac risk factors: HTN. HLD, DM II, obesity  -Denies chest pain. Hemodynamically and electrically stable.  -s/p heparin drip x 48  hours  -Pt is bed-ridden. No indication for LHC per Dr. Roberts. -Continue medical management- aspirin, plavix statin, metoprolol, amlodipine  -Does not appear to be candidate for cardiac rehab at this time as patient is bedridden     #Acute decompensated HFpEF  -ProBNP of 212 (10/7/2022)>4217 on 11/3/22  -Not on diuretic PTA.   -Continue lasix gtt, spironolactone.  -s/p metolazone 2.5 mg x 1 on 11/10, will give additional dose today  -Continue Jardiance ($0 copay)  -Accurate I/O and daily weights    #PVD  -No Hx of stent placement.  -ASA, Plavix, statin    #Nonsustained ventricular tachycardia  -4 beats of NSVT on 11/5, no recurrence noted  -Continue metoprolol  -Keep K >4 and Mg >2  -Continue to monitor on telemetry    #Valvular Heart Disease  -Echo 9/2022: Mild aortic valve stenosis; mean gradient 12 mmHg, KEKE 1.5 cm2    #HTN  -BP acceptable  -Continue current antihypertensive regimen and adjust as needed    #HLD  -statin as above    #DM II  -HgbA1c 9/2022: 5.9  -PTA: metformin and insulin  -Started on Jardiance this admission, continue    #UTI, urosepsis, left lower extremity cellulitis  -on abx  -per primary team/ID  -Wound care/HBO following      Plan:  · Per Dr. Roberts, no LHC. Cont medical management for NSTEMI with ASA, Plavix, Statin, metoprolol, amlodipine  · Continue Lasix drip, aldactone, jardiance. Will give additional metolazone 2.5 mg x 1 today. Accurate I/Os and daily weights.   · Primary team inquired about transferring patient back to [facility]. Patient hemodynamically and electrically stable without chest pain- appears stable from CV standpoint to transfer. Case management following/helping facilitate transfer.        Tamra Christina PA-C/Dr. Roberts  Cardiology  Pager:  [phone]  11/11/2022   Unknown

## 2022-12-01 ENCOUNTER — HOSPITAL ENCOUNTER (EMERGENCY)
Age: 22
Discharge: HOME | End: 2022-12-01
Attending: EMERGENCY MEDICINE
Payer: COMMERCIAL

## 2022-12-01 VITALS
SYSTOLIC BLOOD PRESSURE: 111 MMHG | BODY MASS INDEX: 31.39 KG/M2 | HEART RATE: 69 BPM | RESPIRATION RATE: 16 BRPM | OXYGEN SATURATION: 100 % | WEIGHT: 200 LBS | DIASTOLIC BLOOD PRESSURE: 60 MMHG | HEIGHT: 67 IN | TEMPERATURE: 98.4 F

## 2022-12-01 DIAGNOSIS — R56.9 SEIZURE (HCC): Primary | ICD-10-CM

## 2022-12-01 LAB
ANION GAP SERPL CALC-SCNC: 5 MMOL/L (ref 3–18)
APPEARANCE UR: CLEAR
BASOPHILS # BLD: 0 K/UL (ref 0–0.1)
BASOPHILS NFR BLD: 0 % (ref 0–2)
BILIRUB UR QL: NEGATIVE
BUN SERPL-MCNC: 9 MG/DL (ref 7–18)
BUN/CREAT SERPL: 11 (ref 12–20)
CALCIUM SERPL-MCNC: 8.7 MG/DL (ref 8.5–10.1)
CHLORIDE SERPL-SCNC: 111 MMOL/L (ref 100–111)
CO2 SERPL-SCNC: 23 MMOL/L (ref 21–32)
COLOR UR: YELLOW
CREAT SERPL-MCNC: 0.8 MG/DL (ref 0.6–1.3)
DIFFERENTIAL METHOD BLD: ABNORMAL
EOSINOPHIL # BLD: 0 K/UL (ref 0–0.4)
EOSINOPHIL NFR BLD: 0 % (ref 0–5)
ERYTHROCYTE [DISTWIDTH] IN BLOOD BY AUTOMATED COUNT: 15.7 % (ref 11.6–14.5)
GLUCOSE SERPL-MCNC: 82 MG/DL (ref 74–99)
GLUCOSE UR STRIP.AUTO-MCNC: NEGATIVE MG/DL
HCG UR QL: NEGATIVE
HCT VFR BLD AUTO: 35.7 % (ref 35–45)
HGB BLD-MCNC: 10.6 G/DL (ref 12–16)
HGB UR QL STRIP: NEGATIVE
IMM GRANULOCYTES # BLD AUTO: 0 K/UL (ref 0–0.04)
IMM GRANULOCYTES NFR BLD AUTO: 0 % (ref 0–0.5)
KETONES UR QL STRIP.AUTO: NEGATIVE MG/DL
LEUKOCYTE ESTERASE UR QL STRIP.AUTO: NEGATIVE
LYMPHOCYTES # BLD: 2 K/UL (ref 0.9–3.6)
LYMPHOCYTES NFR BLD: 21 % (ref 21–52)
MCH RBC QN AUTO: 23.9 PG (ref 24–34)
MCHC RBC AUTO-ENTMCNC: 29.7 G/DL (ref 31–37)
MCV RBC AUTO: 80.6 FL (ref 78–100)
MONOCYTES # BLD: 0.6 K/UL (ref 0.05–1.2)
MONOCYTES NFR BLD: 7 % (ref 3–10)
NEUTS SEG # BLD: 6.9 K/UL (ref 1.8–8)
NEUTS SEG NFR BLD: 72 % (ref 40–73)
NITRITE UR QL STRIP.AUTO: NEGATIVE
NRBC # BLD: 0 K/UL (ref 0–0.01)
NRBC BLD-RTO: 0 PER 100 WBC
PH UR STRIP: 7.5 [PH] (ref 5–8)
PLATELET # BLD AUTO: 289 K/UL (ref 135–420)
PMV BLD AUTO: 11.1 FL (ref 9.2–11.8)
POTASSIUM SERPL-SCNC: 3.7 MMOL/L (ref 3.5–5.5)
PROT UR STRIP-MCNC: NEGATIVE MG/DL
RBC # BLD AUTO: 4.43 M/UL (ref 4.2–5.3)
SODIUM SERPL-SCNC: 139 MMOL/L (ref 136–145)
SP GR UR REFRACTOMETRY: 1.01 (ref 1–1.03)
UROBILINOGEN UR QL STRIP.AUTO: 1 EU/DL (ref 0.2–1)
WBC # BLD AUTO: 9.6 K/UL (ref 4.6–13.2)

## 2022-12-01 PROCEDURE — 80235 DRUG ASSAY LACOSAMIDE: CPT

## 2022-12-01 PROCEDURE — 81003 URINALYSIS AUTO W/O SCOPE: CPT

## 2022-12-01 PROCEDURE — 81025 URINE PREGNANCY TEST: CPT

## 2022-12-01 PROCEDURE — 80177 DRUG SCRN QUAN LEVETIRACETAM: CPT

## 2022-12-01 PROCEDURE — 74011250636 HC RX REV CODE- 250/636: Performed by: PHYSICIAN ASSISTANT

## 2022-12-01 PROCEDURE — 80203 DRUG SCREEN QUANT ZONISAMIDE: CPT

## 2022-12-01 PROCEDURE — 74011000258 HC RX REV CODE- 258: Performed by: PHYSICIAN ASSISTANT

## 2022-12-01 PROCEDURE — 80048 BASIC METABOLIC PNL TOTAL CA: CPT

## 2022-12-01 PROCEDURE — 96365 THER/PROPH/DIAG IV INF INIT: CPT

## 2022-12-01 PROCEDURE — 85025 COMPLETE CBC W/AUTO DIFF WBC: CPT

## 2022-12-01 PROCEDURE — 99284 EMERGENCY DEPT VISIT MOD MDM: CPT

## 2022-12-01 PROCEDURE — 36415 COLL VENOUS BLD VENIPUNCTURE: CPT

## 2022-12-01 RX ORDER — LACOSAMIDE 100 MG/1
100 TABLET ORAL 3 TIMES DAILY
COMMUNITY

## 2022-12-01 RX ORDER — LEVETIRACETAM 1000 MG/1
1000 TABLET ORAL 3 TIMES DAILY
COMMUNITY

## 2022-12-01 RX ORDER — CLONAZEPAM 2 MG/1
TABLET ORAL 2 TIMES DAILY
COMMUNITY

## 2022-12-01 RX ORDER — DIAZEPAM 10 MG/2ML
INJECTION INTRAMUSCULAR
Status: DISCONTINUED
Start: 2022-12-01 | End: 2022-12-01 | Stop reason: WASHOUT

## 2022-12-01 RX ORDER — LORAZEPAM 0.5 MG/1
0.5 TABLET ORAL EVERY 6 HOURS
COMMUNITY

## 2022-12-01 RX ADMIN — LEVETIRACETAM 1000 MG: 100 INJECTION, SOLUTION INTRAVENOUS at 19:16

## 2022-12-01 NOTE — ED PROVIDER NOTES
EMERGENCY DEPARTMENT HISTORY AND PHYSICAL EXAM    Date: 12/1/2022  Patient Name: Nidia Anderson    History of Presenting Illness     Chief Complaint   Patient presents with    Seizure         History Provided By: Patient and EM    5:44 PM  Nidia Anderson is a 23 y.o. female with PMHX of seizure disorder who presents to the emergency department C/O witnessed seizure at work just prior to arrival.  EMS was called to patient's place of work for witnessed seizure. Coworkers administered intranasal midazolam and upon EMS arrival, patient was postictal and upon arrival to ED she is alert and oriented. Patient states she remembers being at work but the next thing she remembers is waking up on the floor. She has history of seizure disorder since age 11, on multiple medications, states she has been compliant. Last seizure 2 months ago. Prior to that she has had frequent seizures. Just moved to this area, her neurologist is in [place]. Mother is on the phone with her at time of my assessment and and is on her way up here. Patient states she does not think she bit her tongue or injured herself from the seizure, but feels achy all over. Pt denies severe headache, tongue biting, incontinence, and any other sxs or complaints. PCP: Leah Anne MD    Current Facility-Administered Medications   Medication Dose Route Frequency Provider Last Rate Last Admin    levETIRAcetam (KEPPRA) 1,000 mg in 0.9% sodium chloride 100 mL IVPB  1,000 mg IntraVENous NOW IMMANUEL Sarmiento         Current Outpatient Medications   Medication Sig Dispense Refill    levETIRAcetam 1,000 mg tablet Take 1,000 mg by mouth three (3) times daily. LORazepam (Ativan) 0.5 mg tablet Take 0.5 mg by mouth every six (6) hours. clonazePAM (KlonoPIN) 2 mg tablet Take  by mouth two (2) times a day. zonisamide (ZONISADE PO) Take 300 mg by mouth. lacosamide (VIMPAT) 100 mg tab tablet Take 100 mg by mouth three (3) times daily. perampaneL (Fycompa) 8 mg tab tablet Take 8 mg by mouth nightly. Past History     Past Medical History:  Past Medical History:   Diagnosis Date    Seizure disorder [facility])        Past Surgical History:  No past surgical history on file. Family History:  No family history on file. Social History:  Social History     Tobacco Use    Smoking status: Never    Smokeless tobacco: Never   Substance Use Topics    Alcohol use: Never    Drug use: Never       Allergies: Allergies   Allergen Reactions    Lamictal [Lamotrigine] Hives         Review of Systems   Review of Systems   Constitutional: Negative. Negative for fever. Respiratory: Negative. Musculoskeletal:  Positive for myalgias. Negative for arthralgias. Neurological:  Positive for seizures. All other systems reviewed and are negative. Physical Exam     Vitals:    12/01/22 1709 12/01/22 1816   BP: 120/61    Pulse: 70 72   Resp: 14 17   Temp: 98.4 °F (36.9 °C)    SpO2: 100% 100%   Weight: 90.7 kg (200 lb)    Height: 5' 7\" (1.702 m)      Physical Exam  Vital signs and nursing notes reviewed. CONSTITUTIONAL: Alert. Well-appearing; well-nourished; in no apparent distress. HEAD: Normocephalic; atraumatic. EYES: PERRL; EOM's intact. No nystagmus. Conjunctiva clear. ENT: TM's normal. External ear normal. Normal nose; no rhinorrhea. Normal pharynx. Moist mucus membranes. NECK: Supple; FROM without difficulty, non-tender; no cervical lymphadenopathy. CV: Normal S1, S2; no murmurs, rubs, or gallops. No chest wall tenderness. RESPIRATORY: Normal chest excursion with respiration; breath sounds clear and equal bilaterally; no wheezes, rhonchi, or rales. EXT: Normal ROM in all four extremities; non-tender to palpation. SKIN: Normal for age and race; warm; dry; good turgor; no apparent lesions or exudate. NEURO: A & O x3. Cranial nerves 2-12 intact. Motor 5/5 bilaterally. Sensation intact. PSYCH:  Mood and affect appropriate. Diagnostic Study Results     Labs -     Recent Results (from the past 12 hour(s))   METABOLIC PANEL, BASIC    Collection Time: 12/01/22  5:35 PM   Result Value Ref Range    Sodium 139 136 - 145 mmol/L    Potassium 3.7 3.5 - 5.5 mmol/L    Chloride 111 100 - 111 mmol/L    CO2 23 21 - 32 mmol/L    Anion gap 5 3.0 - 18 mmol/L    Glucose 82 74 - 99 mg/dL    BUN 9 7.0 - 18 MG/DL    Creatinine 0.80 0.6 - 1.3 MG/DL    BUN/Creatinine ratio 11 (L) 12 - 20      eGFR >60 >60 ml/min/1.73m2    Calcium 8.7 8.5 - 10.1 MG/DL   CBC WITH AUTOMATED DIFF    Collection Time: 12/01/22  5:35 PM   Result Value Ref Range    WBC 9.6 4.6 - 13.2 K/uL    RBC 4.43 4.20 - 5.30 M/uL    HGB 10.6 (L) 12.0 - 16.0 g/dL    HCT 35.7 35.0 - 45.0 %    MCV 80.6 78.0 - 100.0 FL    MCH 23.9 (L) 24.0 - 34.0 PG    MCHC 29.7 (L) 31.0 - 37.0 g/dL    RDW 15.7 (H) 11.6 - 14.5 %    PLATELET 156 710 - 473 K/uL    MPV 11.1 9.2 - 11.8 FL    NRBC 0.0 0  WBC    ABSOLUTE NRBC 0.00 0.00 - 0.01 K/uL    NEUTROPHILS 72 40 - 73 %    LYMPHOCYTES 21 21 - 52 %    MONOCYTES 7 3 - 10 %    EOSINOPHILS 0 0 - 5 %    BASOPHILS 0 0 - 2 %    IMMATURE GRANULOCYTES 0 0.0 - 0.5 %    ABS. NEUTROPHILS 6.9 1.8 - 8.0 K/UL    ABS. LYMPHOCYTES 2.0 0.9 - 3.6 K/UL    ABS. MONOCYTES 0.6 0.05 - 1.2 K/UL    ABS. EOSINOPHILS 0.0 0.0 - 0.4 K/UL    ABS. BASOPHILS 0.0 0.0 - 0.1 K/UL    ABS. IMM.  GRANS. 0.0 0.00 - 0.04 K/UL    DF AUTOMATED     URINALYSIS W/ RFLX MICROSCOPIC    Collection Time: 12/01/22  6:31 PM   Result Value Ref Range    Color YELLOW      Appearance CLEAR      Specific gravity 1.010 1.005 - 1.030      pH (UA) 7.5 5.0 - 8.0      Protein Negative NEG mg/dL    Glucose Negative NEG mg/dL    Ketone Negative NEG mg/dL    Bilirubin Negative NEG      Blood Negative NEG      Urobilinogen 1.0 0.2 - 1.0 EU/dL    Nitrites Negative NEG      Leukocyte Esterase Negative NEG     HCG URINE, QL    Collection Time: 12/01/22  6:31 PM   Result Value Ref Range    HCG urine, QL Negative NEG Radiologic Studies -   No orders to display     CT Results  (Last 48 hours)      None          CXR Results  (Last 48 hours)      None            Medications given in the ED-  Medications   levETIRAcetam (KEPPRA) 1,000 mg in 0.9% sodium chloride 100 mL IVPB (has no administration in time range)         Medical Decision Making   I am the first provider for this patient. I reviewed the vital signs, available nursing notes, past medical history, past surgical history, family history and social history. Vital Signs-Reviewed the patient's vital signs. Records Reviewed: Nursing Notes      Procedures:  Procedures    ED Course:  5:44 PM   Initial assessment performed. The patients presenting problems have been discussed, and they are in agreement with the care plan formulated and outlined with them. I have encouraged them to ask questions as they arise throughout their visit. Provider Notes (Medical Decision Making): Esther Mcclure is a 23 y.o. female with seizure disorder on multiple medications and just moved to this area ,presents with a witnessed seizure at work just prior to arrival.  Upon ED she appeared in no distress, was a little upset but otherwise normal exam.  She has remained stable, no further seizure-like activity while in the ED. Patient was given loading dose of Keppra continue meds as noted at home and recommendation for neurologist here provided to patient for follow-up. Diagnosis and Disposition       DISCHARGE NOTE:    Castillo Elamlul Saniya's  results have been reviewed with her. She has been counseled regarding her diagnosis, treatment, and plan. She verbally conveys understanding and agreement of the signs, symptoms, diagnosis, treatment and prognosis and additionally agrees to follow up as discussed. She also agrees with the care-plan and conveys that all of her questions have been answered.   I have also provided discharge instructions for her that include: educational information regarding their diagnosis and treatment, and list of reasons why they would want to return to the ED prior to their follow-up appointment, should her condition change. She has been provided with education for proper emergency department utilization. CLINICAL IMPRESSION:    1. Seizure ([address]        PLAN:  1. D/C Home  2. Current Discharge Medication List        3. Follow-up Information       Follow up With Specialties Details Why Contact Info    Felipe Sandoval MD Neurology Schedule an appointment as soon as possible for a visit   [address] [place] [id number] [phone]      [facility] EMERGENCY DEPT Emergency Medicine  As needed, If symptoms worsen [address] [phone]          _______________________________      Please note that this dictation was completed with [employer], the computer voice recognition software. Quite often unanticipated grammatical, syntax, homophones, and other interpretive errors are inadvertently transcribed by the computer software. Please disregard these errors. Please excuse any errors that have escaped final proofreading.

## 2022-12-05 LAB — LEVETIRACETAM SERPL-MCNC: 30.7 UG/ML (ref 10–40)

## 2022-12-06 LAB — ZONISAMIDE SERPL-MCNC: 34.8 UG/ML (ref 10–40)

## 2022-12-07 LAB — LACOSAMIDE SERPL-MCNC: 5.8 UG/ML (ref 5–10)

## 2023-03-10 VITALS
SYSTOLIC BLOOD PRESSURE: 111 MMHG | RESPIRATION RATE: 18 BRPM | TEMPERATURE: 98.1 F | OXYGEN SATURATION: 100 % | DIASTOLIC BLOOD PRESSURE: 65 MMHG | HEART RATE: 90 BPM | WEIGHT: 195 LBS | HEIGHT: 69 IN | BODY MASS INDEX: 28.88 KG/M2

## 2023-03-10 PROCEDURE — 99283 EMERGENCY DEPT VISIT LOW MDM: CPT

## 2023-03-10 ASSESSMENT — PAIN - FUNCTIONAL ASSESSMENT: PAIN_FUNCTIONAL_ASSESSMENT: NONE - DENIES PAIN

## 2023-03-11 ENCOUNTER — HOSPITAL ENCOUNTER (EMERGENCY)
Facility: HOSPITAL | Age: 23
Discharge: HOME | End: 2023-03-11
Attending: EMERGENCY MEDICINE
Payer: MEDICAID

## 2023-03-11 DIAGNOSIS — Z76.0 ENCOUNTER FOR MEDICATION REFILL: ICD-10-CM

## 2023-03-11 DIAGNOSIS — G40.909 RECURRENT SEIZURES (HCC): Primary | ICD-10-CM

## 2023-03-11 PROCEDURE — 6370000000 HC RX 637 (ALT 250 FOR IP): Performed by: PHYSICIAN ASSISTANT

## 2023-03-11 RX ORDER — LACOSAMIDE 100 MG/1
100 TABLET ORAL ONCE
Status: COMPLETED | OUTPATIENT
Start: 2023-03-11 | End: 2023-03-11

## 2023-03-11 RX ORDER — LACOSAMIDE 100 MG/1
100 TABLET ORAL 3 TIMES DAILY
Qty: 42 TABLET | Refills: 0 | Status: SHIPPED | OUTPATIENT
Start: 2023-03-11 | End: 2023-03-25

## 2023-03-11 RX ORDER — LEVETIRACETAM 500 MG/1
750 TABLET ORAL 3 TIMES DAILY
Qty: 63 TABLET | Refills: 0 | Status: SHIPPED | OUTPATIENT
Start: 2023-03-11 | End: 2023-03-25

## 2023-03-11 RX ADMIN — LACOSAMIDE 100 MG: 100 TABLET, FILM COATED ORAL at 01:26

## 2023-03-11 NOTE — ED TRIAGE NOTES
Pt to be eval for 4 seizures today after missing her Vim pat  x3 doses today; med mistaken was thrown away. Pt took her night time doses of keppra, lorazepam, clonazepam, folic  acid and fycompa pta. Pt was given a gummie by her mother to decrease the chance of having a another sz. Pt states she feels foggy but denies any pain at this time.

## 2023-03-11 NOTE — ED PROVIDER NOTES
THE [facility] EMERGENCY DEPT  EMERGENCY DEPARTMENT ENCOUNTER       Pt Name: Cipriano Mathew  MRN: [id number]  [place] 2000  Date of evaluation: 3/10/2023  PCP: No primary care provider on file. Note Started: 2:35 AM 3/11/23     CHIEF COMPLAINT       Chief Complaint   Patient presents with    Seizures        HISTORY OF PRESENT ILLNESS: 1 or more elements      History From: Patient  HPI Limitations: None  Chronic Conditions: none  Social Determinants affecting Dx or Tx: none      Cipriano Mathew is a 23 y.o. female who presents to ED c/o concern for 4 seizures \"back-to-back\" earlier this afternoon that was apparently witnessed by family. Patient presented with her significant other as they were instructed to go to the ER due to a \"cluster of seizures\" earlier today. Her last seizure was around 7 PM.  Patient reports history of severe seizure disorder, recently moved to [place] from [place] about 6 months ago and still goes back to [place] for neurology appointments and medication refills until she can get insurance and establish care here. She apparently lost her Vimpat, last dose yesterday and apparently had 4 brief seizures back-to-back while at her parents restaurant this afternoon. They waited for her boyfriend to get off work so that he can bring her to the ER but in the meantime she took her lorazepam and a THC gummy. She feels a little drowsy but has not had any further seizure-like activity. She takes Keppra 750 3 times daily, Fycompa 8 mg nightly, Vimpat 100 mg 3 times daily, clonazepam and lorazepam as needed. Patient denies any injuries related to the seizures, recent illness, any complaints of pain. She states she will need a refill of her Vimpat Keppra and Fycompa as she is running low and is awaiting for her mother to take her back to [place] for refills. Nursing Notes were all reviewed and agreed with or any disagreements were addressed in the HPI.     PAST HISTORY     Past Medical History:  Past Medical History:   Diagnosis Date    Seizure disorder [facility])        Past Surgical History:  No past surgical history on file. Family History:  No family history on file. Social History:  Social History     Socioeconomic History    Marital status: Single   Tobacco Use    Smoking status: Never    Smokeless tobacco: Never   Substance and Sexual Activity    Alcohol use: Never    Drug use: Never       Allergies: Allergies   Allergen Reactions    Dilantin [Phenytoin] Hives    Lamotrigine Hives       CURRENT MEDICATIONS      No current facility-administered medications for this encounter. Current Outpatient Medications   Medication Sig Dispense Refill    lacosamide (VIMPAT) 100 MG TABS tablet Take 1 tablet by mouth 3 times daily for 14 days. Max Daily Amount: 300 mg 42 tablet 0    levETIRAcetam (KEPPRA) 500 MG tablet Take 1.5 tablets by mouth 3 times daily for 14 days 63 tablet 0    perampanel (FYCOMPA) 8 MG TABS tablet Take 1 tablet by mouth nightly for 14 days. Max Daily Amount: 8 mg 14 tablet 0    clonazePAM (KLONOPIN) 2 MG tablet Take by mouth 2 times daily. LORazepam (ATIVAN) 0.5 MG tablet Take 0.5 mg by mouth in the morning and 0.5 mg at noon and 0.5 mg in the evening and 0.5 mg before bedtime. PHYSICAL EXAM      Vitals:    03/10/23 2357   BP: 111/65   Pulse: 90   Resp: 18   Temp: 98.1 °F (36.7 °C)   TempSrc: Oral   SpO2: 100%   Weight: 195 lb (88.5 kg)   Height: 5' 9\" (1.753 m)     Physical Exam    Vital signs and nursing notes reviewed. CONSTITUTIONAL: Alert. Well-appearing; well-nourished; in no apparent distress. HEAD: Normocephalic; atraumatic. EYES: Conjunctiva clear. ENT: Moist mucus membranes. NECK: Supple; FROM without difficulty, non-tender; no cervical lymphadenopathy. CV: Normal S1, S2; no murmurs, rubs, or gallops. No chest wall tenderness.   RESPIRATORY: Normal chest excursion with respiration; breath sounds clear and equal bilaterally; no wheezes, rhonchi, or rales. SKIN: Normal for age and race; warm; dry; good turgor; no apparent lesions or exudate. NEURO: A & O x3. Cranial nerves 2-12 intact. Motor 5/5 bilaterally. Sensation intact. PSYCH:  Mood and affect appropriate. DIAGNOSTIC RESULTS   LABS:    No results found for this or any previous visit (from the past 24 hour(s)). Labs Reviewed - No data to display    No orders to display           PROCEDURES   Unless otherwise noted below, none  Procedures         CRITICAL CARE TIME   none    EMERGENCY DEPARTMENT COURSE and DIFFERENTIAL DIAGNOSIS/MDM   Vitals:    Vitals:    03/10/23 2357   BP: 111/65   Pulse: 90   Resp: 18   Temp: 98.1 °F (36.7 °C)   TempSrc: Oral   SpO2: 100%   Weight: 195 lb (88.5 kg)   Height: 5' 9\" (1.753 m)       Patient was given the following medications:  Medications   lacosamide (VIMPAT) tablet 100 mg (100 mg Oral Given 3/11/23 0126)           Records Reviewed (source and summary): Nursing notes. ED COURSE       Medial Decision Making:  DDX: seizure disorder, medication refill/noncompliance    23 y.o. female  In evaluation of the above differential diagnosis, consideration was given to the following tests and treatments: dose of her Vimpat. Patient presents with an apparent witnessed cluster of 4 brief seizures today which she did return to normal in between. Has not had any seizures over the last few hours since taking her lorazepam and a THC gummy. This is likely because she lost her Vimpat last dose was last night though she does have very frequent seizures per her and her boyfriend who is here with her. She has no signs of infection, no other symptoms to suggest another cause of her seizures today. There is no witnessed injury or trauma to warrant CT imaging or further work-up. I did give her dose of her normal Vimpat as well as refill of her Vimpat, Keppra and Fycompa.   Patient also provided free clinic resources for follow-up I discussed each of these tests and considerations with the patient. They agree with the plan of discharge and outpatient follow-up. FINAL IMPRESSION     1. Recurrent seizures ([address]    2. Encounter for medication refill            DISPOSITION/PLAN   DISPOSITION Decision To Discharge 03/11/2023 12:59:44 AM      Discharged       PATIENT REFERRED TO:  [facility]  [address],  [address] [address],  Phone: [phone]  Schedule an appointment as soon as possible for a visit   As soon as possible, For follow up from the Emergency Department    [facility] [address],  [address], [address] [place],   Phone: [phone]  Schedule an appointment as soon as possible for a visit   As soon as possible, For follow up from the Emergency Department    [facility] EMERGENCY DEPT  [address]  [phone]    As needed, If symptoms worsen       DISCHARGE MEDICATIONS:     Medication List        START taking these medications      levETIRAcetam 500 MG tablet  Commonly known as: Keppra  Take 1.5 tablets by mouth 3 times daily for 14 days     perampanel 8 MG Tabs tablet  Commonly known as: Fycompa  Take 1 tablet by mouth nightly for 14 days. Max Daily Amount: 8 mg            CONTINUE taking these medications      lacosamide 100 MG Tabs tablet  Commonly known as: VIMPAT  Take 1 tablet by mouth 3 times daily for 14 days. Max Daily Amount: 300 mg            ASK your doctor about these medications      clonazePAM 2 MG tablet  Commonly known as: KLONOPIN     LORazepam 0.5 MG tablet  Commonly known as: ATIVAN               Where to Get Your Medications        These medications were sent to [facility], [address] [place] [address], [address] [place] [address]      Phone: [phone]   lacosamide 100 MG Tabs tablet  levETIRAcetam 500 MG tablet  perampanel 8 MG Tabs tablet                I am the Primary Clinician of Record. (Please note that parts of this dictation were completed with voice recognition software. Quite often unanticipated grammatical, syntax, homophones, and other interpretive errors are inadvertently transcribed by the computer software. Please disregards these errors.  Please excuse any errors that have escaped final proofreading.)       Vaughn Turnerma  03/11/23 [id number]

## 2023-03-16 ENCOUNTER — HOSPITAL ENCOUNTER (EMERGENCY)
Facility: HOSPITAL | Age: 23
Discharge: HOME | End: 2023-03-16
Attending: FAMILY MEDICINE | Admitting: FAMILY MEDICINE
Payer: MEDICAID

## 2023-03-16 VITALS
HEART RATE: 67 BPM | TEMPERATURE: 97.9 F | RESPIRATION RATE: 17 BRPM | OXYGEN SATURATION: 100 % | WEIGHT: 185 LBS | DIASTOLIC BLOOD PRESSURE: 65 MMHG | HEIGHT: 69 IN | BODY MASS INDEX: 27.4 KG/M2 | SYSTOLIC BLOOD PRESSURE: 100 MMHG

## 2023-03-16 DIAGNOSIS — R56.9 SEIZURE (HCC): Primary | ICD-10-CM

## 2023-03-16 LAB
ALBUMIN SERPL-MCNC: 3.6 G/DL (ref 3.4–5)
ALBUMIN/GLOB SERPL: 0.7 (ref 0.8–1.7)
ALP SERPL-CCNC: 77 U/L (ref 45–117)
ALT SERPL-CCNC: 18 U/L (ref 13–56)
ANION GAP SERPL CALC-SCNC: 5 MMOL/L (ref 3–18)
AST SERPL-CCNC: 13 U/L (ref 10–38)
BASOPHILS # BLD: 0.1 K/UL (ref 0–0.1)
BASOPHILS NFR BLD: 1 % (ref 0–2)
BILIRUB SERPL-MCNC: 0.1 MG/DL (ref 0.2–1)
BUN SERPL-MCNC: 13 MG/DL (ref 7–18)
BUN/CREAT SERPL: 12 (ref 12–20)
CALCIUM SERPL-MCNC: 8.4 MG/DL (ref 8.5–10.1)
CHLORIDE SERPL-SCNC: 113 MMOL/L (ref 100–111)
CO2 SERPL-SCNC: 22 MMOL/L (ref 21–32)
CREAT SERPL-MCNC: 1.05 MG/DL (ref 0.6–1.3)
DIFFERENTIAL METHOD BLD: ABNORMAL
EOSINOPHIL # BLD: 0.1 K/UL (ref 0–0.4)
EOSINOPHIL NFR BLD: 1 % (ref 0–5)
ERYTHROCYTE [DISTWIDTH] IN BLOOD BY AUTOMATED COUNT: 16 % (ref 11.6–14.5)
GLOBULIN SER CALC-MCNC: 4.9 G/DL (ref 2–4)
GLUCOSE SERPL-MCNC: 90 MG/DL (ref 74–99)
HCT VFR BLD AUTO: 34.3 % (ref 35–45)
HGB BLD-MCNC: 10 G/DL (ref 12–16)
IMM GRANULOCYTES # BLD AUTO: 0 K/UL (ref 0–0.04)
IMM GRANULOCYTES NFR BLD AUTO: 0 % (ref 0–0.5)
LYMPHOCYTES # BLD: 2.8 K/UL (ref 0.9–3.6)
LYMPHOCYTES NFR BLD: 29 % (ref 21–52)
MCH RBC QN AUTO: 23.2 PG (ref 24–34)
MCHC RBC AUTO-ENTMCNC: 29.2 G/DL (ref 31–37)
MCV RBC AUTO: 79.6 FL (ref 78–100)
MONOCYTES # BLD: 0.9 K/UL (ref 0.05–1.2)
MONOCYTES NFR BLD: 9 % (ref 3–10)
NEUTS SEG # BLD: 5.8 K/UL (ref 1.8–8)
NEUTS SEG NFR BLD: 61 % (ref 40–73)
NRBC # BLD: 0 K/UL (ref 0–0.01)
NRBC BLD-RTO: 0 PER 100 WBC
PHENYTOIN SERPL-MCNC: <0.4 UG/ML (ref 10–20)
PLATELET # BLD AUTO: 276 K/UL (ref 135–420)
PMV BLD AUTO: 10.3 FL (ref 9.2–11.8)
POTASSIUM SERPL-SCNC: 4.1 MMOL/L (ref 3.5–5.5)
PROT SERPL-MCNC: 8.5 G/DL (ref 6.4–8.2)
RBC # BLD AUTO: 4.31 M/UL (ref 4.2–5.3)
SALICYLATES SERPL-MCNC: <1.7 MG/DL (ref 2.8–20)
SODIUM SERPL-SCNC: 140 MMOL/L (ref 136–145)
WBC # BLD AUTO: 9.6 K/UL (ref 4.6–13.2)

## 2023-03-16 PROCEDURE — 80185 ASSAY OF PHENYTOIN TOTAL: CPT

## 2023-03-16 PROCEDURE — 6360000002 HC RX W HCPCS

## 2023-03-16 PROCEDURE — 2580000003 HC RX 258: Performed by: FAMILY MEDICINE

## 2023-03-16 PROCEDURE — 99284 EMERGENCY DEPT VISIT MOD MDM: CPT

## 2023-03-16 PROCEDURE — 80053 COMPREHEN METABOLIC PANEL: CPT

## 2023-03-16 PROCEDURE — 80179 DRUG ASSAY SALICYLATE: CPT

## 2023-03-16 PROCEDURE — 2580000003 HC RX 258: Performed by: EMERGENCY MEDICINE

## 2023-03-16 PROCEDURE — 80177 DRUG SCRN QUAN LEVETIRACETAM: CPT

## 2023-03-16 PROCEDURE — 85025 COMPLETE CBC W/AUTO DIFF WBC: CPT

## 2023-03-16 PROCEDURE — C9254 INJECTION, LACOSAMIDE: HCPCS | Performed by: EMERGENCY MEDICINE

## 2023-03-16 PROCEDURE — 6360000002 HC RX W HCPCS: Performed by: EMERGENCY MEDICINE

## 2023-03-16 PROCEDURE — 6360000002 HC RX W HCPCS: Performed by: FAMILY MEDICINE

## 2023-03-16 PROCEDURE — 96375 TX/PRO/DX INJ NEW DRUG ADDON: CPT

## 2023-03-16 PROCEDURE — 6370000000 HC RX 637 (ALT 250 FOR IP): Performed by: FAMILY MEDICINE

## 2023-03-16 PROCEDURE — 96365 THER/PROPH/DIAG IV INF INIT: CPT

## 2023-03-16 RX ORDER — LACOSAMIDE 100 MG/1
100 TABLET ORAL 2 TIMES DAILY
Status: DISCONTINUED | OUTPATIENT
Start: 2023-03-16 | End: 2023-03-16

## 2023-03-16 RX ORDER — LACOSAMIDE 100 MG/1
100 TABLET ORAL 2 TIMES DAILY
Status: DISCONTINUED | OUTPATIENT
Start: 2023-03-16 | End: 2023-03-16 | Stop reason: HOSPADM

## 2023-03-16 RX ORDER — LORAZEPAM 2 MG/ML
2 INJECTION INTRAMUSCULAR EVERY 6 HOURS PRN
Status: DISCONTINUED | OUTPATIENT
Start: 2023-03-16 | End: 2023-03-16 | Stop reason: HOSPADM

## 2023-03-16 RX ORDER — LORAZEPAM 2 MG/ML
2 INJECTION INTRAMUSCULAR ONCE
Status: DISCONTINUED | OUTPATIENT
Start: 2023-03-16 | End: 2023-03-16 | Stop reason: HOSPADM

## 2023-03-16 RX ORDER — LACOSAMIDE 100 MG/1
200 TABLET ORAL
Status: DISCONTINUED | OUTPATIENT
Start: 2023-03-16 | End: 2023-03-16 | Stop reason: DRUGHIGH

## 2023-03-16 RX ORDER — LORAZEPAM 2 MG/ML
INJECTION INTRAMUSCULAR
Status: COMPLETED
Start: 2023-03-16 | End: 2023-03-16

## 2023-03-16 RX ADMIN — LORAZEPAM 2 MG: 2 INJECTION INTRAMUSCULAR; INTRAVENOUS at 01:23

## 2023-03-16 RX ADMIN — LORAZEPAM 2 MG: 2 INJECTION INTRAMUSCULAR; INTRAVENOUS at 00:57

## 2023-03-16 RX ADMIN — SODIUM CHLORIDE 200 MG: 9 INJECTION, SOLUTION INTRAVENOUS at 01:50

## 2023-03-16 RX ADMIN — LEVETIRACETAM 500 MG: 100 INJECTION, SOLUTION INTRAVENOUS at 01:11

## 2023-03-16 RX ADMIN — LACOSAMIDE 100 MG: 100 TABLET, FILM COATED ORAL at 03:45

## 2023-03-16 NOTE — ED TRIAGE NOTES
Pt was at home when family witnessed a seizure. Pt has had a total of 3 seizures with the last being on the ambulance. Versed 2.5 mg was given. Pt is alert and able to communicate and answering questions appropriately.

## 2023-03-16 NOTE — ED NOTES
[facility] EMERGENCY DEPT  EMERGENCY DEPARTMENT ENCOUNTER    Patient Name: Alex Neves  MRN: [id number]  YOB: 2000  Provider: Radha Estrada MD  PCP: Pcp No   Time/Date of evaluation: 3:49 AM EDT on 3/16/23    History of Presenting Illness     Chief Complaint   Patient presents with    Seizures       History Provided by: Patient and Patient's Mother   History is limited by: Nothing and Mental Status Change    HISTORY Nomi Kennedy): Alex Neves is a 23 y.o. female with a history of recurrent seizures who presents to the emergency department brought by EMS because of several seizures that she had this evening. She was seen 5 days ago for the same problem, and she has been seen several times this year for seizures. Today, the reports that 2 of her medications that she takes for seizures were missed because the pharmacy did not have the medication in stock, this is the lacosamide and the perampanel. The seizures that she had this evening were approximately a minute long,  by 5 minutes. She was given a 2.5 mg of midazolam by the EMT. Nursing Notes were all reviewed and agreed with or any disagreements were addressed in the HPI. Past History     PAST MEDICAL HISTORY:  Past Medical History:   Diagnosis Date    Seizure disorder ([facility] [address]        PAST SURGICAL HISTORY:  No past surgical history on file. FAMILY HISTORY:  No family history on file.     SOCIAL HISTORY:  Social History     Tobacco Use    Smoking status: Never    Smokeless tobacco: Never   Substance Use Topics    Alcohol use: Never    Drug use: Never       MEDICATIONS:  Current Facility-Administered Medications   Medication Dose Route Frequency Provider Last Rate Last Admin    LORazepam (ATIVAN) injection 2 mg  2 mg IntraVENous Q6H PRN Radha Estrada MD   2 mg at 03/16/23 0123    LORazepam (ATIVAN) injection 2 mg  2 mg IntraVENous Once Radha Estrada MD        lacosamide (VIMPAT) tablet 100 mg  100 mg Oral BID Radha Estrada MD 100 mg at 03/16/23 0345     Current Outpatient Medications   Medication Sig Dispense Refill    lacosamide (VIMPAT) 100 MG TABS tablet Take 1 tablet by mouth 3 times daily for 14 days. Max Daily Amount: 300 mg 42 tablet 0    levETIRAcetam (KEPPRA) 500 MG tablet Take 1.5 tablets by mouth 3 times daily for 14 days 63 tablet 0    perampanel (FYCOMPA) 8 MG TABS tablet Take 1 tablet by mouth nightly for 14 days. Max Daily Amount: 8 mg 14 tablet 0    clonazePAM (KLONOPIN) 2 MG tablet Take by mouth 2 times daily. LORazepam (ATIVAN) 0.5 MG tablet Take 0.5 mg by mouth in the morning and 0.5 mg at noon and 0.5 mg in the evening and 0.5 mg before bedtime. ALLERGIES:  Allergies   Allergen Reactions    Dilantin [Phenytoin] Hives    Lamotrigine Hives       SOCIAL DETERMINANTS OF HEALTH:  Social Determinants of Health     Tobacco Use: Low Risk     Smoking Tobacco Use: Never    Smokeless Tobacco Use: Never    Passive Exposure: Not on file   Alcohol Use: Not on file   Financial Resource Strain: Not on file   Food Insecurity: Not on file   Transportation Needs: Not on file   Physical Activity: Not on file   Stress: Not on file   Social Connections: Not on file   Intimate Partner Violence: Not on file   Depression: Not on file   Housing Stability: Not on file       Review of Systems     Negative except as listed above in HPI. Physical Exam     Vitals:    03/16/23 0200 03/16/23 0215 03/16/23 0230 03/16/23 0315   BP: 102/64 109/66 101/67 100/65   Pulse: 68 77 75 67   Resp:       Temp:       TempSrc:       SpO2: 100% 100% 100% 100%   Weight:       Height:         Physical Exam  Constitutional:       General: She is not in acute distress. Appearance: Normal appearance. Comments: Somnolent but arousable  Family reports normal for postictal   HENT:      Head: Normocephalic and atraumatic.       Right Ear: External ear normal.      Left Ear: External ear normal.      Nose: Nose normal.      Mouth/Throat:      Mouth: Mucous membranes are moist.   Eyes:      Extraocular Movements: Extraocular movements intact. Pupils: Pupils are equal, round, and reactive to light. Cardiovascular:      Rate and Rhythm: Normal rate and regular rhythm. Pulses: Normal pulses. Heart sounds: Normal heart sounds. No murmur heard. Pulmonary:      Effort: Pulmonary effort is normal. No respiratory distress. Abdominal:      General: Abdomen is flat. There is no distension. Palpations: Abdomen is soft. Tenderness: There is no abdominal tenderness. There is no guarding. Hernia: No hernia is present. Musculoskeletal:         General: No swelling, tenderness or deformity. Normal range of motion. Cervical back: No rigidity or tenderness. Skin:     General: Skin is warm and dry. Findings: No bruising. Neurological:      Mental Status: She is oriented to person, place, and time. Mental status is at baseline.    Psychiatric:         Behavior: Behavior normal.           Diagnostic Study Results     LABS:  Recent Results (from the past 12 hour(s))   CBC with Auto Differential    Collection Time: 03/16/23 12:09 AM   Result Value Ref Range    WBC 9.6 4.6 - 13.2 K/uL    RBC 4.31 4.20 - 5.30 M/uL    Hemoglobin 10.0 (L) 12.0 - 16.0 g/dL    Hematocrit 34.3 (L) 35.0 - 45.0 %    MCV 79.6 78.0 - 100.0 FL    MCH 23.2 (L) 24.0 - 34.0 PG    MCHC 29.2 (L) 31.0 - 37.0 g/dL    RDW 16.0 (H) 11.6 - 14.5 %    Platelets 804 943 - 156 K/uL    MPV 10.3 9.2 - 11.8 FL    Nucleated RBCs 0.0 0  WBC    nRBC 0.00 0.00 - 0.01 K/uL    Seg Neutrophils 61 40 - 73 %    Lymphocytes 29 21 - 52 %    Monocytes 9 3 - 10 %    Eosinophils % 1 0 - 5 %    Basophils 1 0 - 2 %    Immature Granulocytes 0 0.0 - 0.5 %    Segs Absolute 5.8 1.8 - 8.0 K/UL    Absolute Lymph # 2.8 0.9 - 3.6 K/UL    Absolute Mono # 0.9 0.05 - 1.2 K/UL    Absolute Eos # 0.1 0.0 - 0.4 K/UL    Basophils Absolute 0.1 0.0 - 0.1 K/UL    Absolute Immature Granulocyte 0.0 0.00 - 0.04 K/UL    Differential Type AUTOMATED     Comprehensive Metabolic Panel    Collection Time: 03/16/23 12:09 AM   Result Value Ref Range    Sodium 140 136 - 145 mmol/L    Potassium 4.1 3.5 - 5.5 mmol/L    Chloride 113 (H) 100 - 111 mmol/L    CO2 22 21 - 32 mmol/L    Anion Gap 5 3.0 - 18 mmol/L    Glucose 90 74 - 99 mg/dL    BUN 13 7.0 - 18 MG/DL    Creatinine 1.05 0.6 - 1.3 MG/DL    Bun/Cre Ratio 12 12 - 20      Est, Glom Filt Rate >60 >60 ml/min/1.73m2    Calcium 8.4 (L) 8.5 - 10.1 MG/DL    Total Bilirubin 0.1 (L) 0.2 - 1.0 MG/DL    ALT 18 13 - 56 U/L    AST 13 10 - 38 U/L    Alk Phosphatase 77 45 - 117 U/L    Total Protein 8.5 (H) 6.4 - 8.2 g/dL    Albumin 3.6 3.4 - 5.0 g/dL    Globulin 4.9 (H) 2.0 - 4.0 g/dL    Albumin/Globulin Ratio 0.7 (L) 0.8 - 1.7     Phenytoin Level, Total    Collection Time: 03/16/23 12:09 AM   Result Value Ref Range    Phenytoin Lvl <0.4 (L) 10.0 - 20.0 ug/mL   Salicylate    Collection Time: 03/16/23 12:09 AM   Result Value Ref Range    Salicylate, Serum <1.7 (L) 2.8 - 20.0 MG/DL       RADIOLOGIC STUDIES:   Non x-ray images such as CT, Ultrasound and MRI are read by the radiologist. X-ray images are visualized and preliminarily interpreted by the ED Provider with the findings as listed in the ED Course section below.     Interpretation per the Radiologist is listed below, if available at the time of this note:    No orders to display       Procedures     Procedures    ED Course     3:49 AM EDT I (Emilie Kan MD) am the first provider for this patient. Initial assessment performed. I reviewed the vital signs, available nursing notes, past medical history, past surgical history, family history and social history. The patients presenting problems have been discussed, and they are in agreement with the care plan formulated and outlined with them.  I have encouraged them to ask questions as they arise throughout their visit.    RECORDS REVIEWED: Nursing Notes and Old Medical Records    Is this  patient to be included in the SEP-1 core measure due to severe sepsis or septic shock? No Exclusion criteria - the patient is NOT to be included for SEP-1 Core Measure due to: Infection is not suspected    MEDICATIONS ADMINISTERED IN THE ED:  Medications   LORazepam (ATIVAN) injection 2 mg (2 mg IntraVENous Given 3/16/23 0123)   LORazepam (ATIVAN) injection 2 mg (has no administration in time range)   lacosamide (VIMPAT) tablet 100 mg (100 mg Oral Given 3/16/23 0345)   levETIRAcetam (KEPPRA) 500 mg in sodium chloride 0.9 % 100 mL IVPB (0 mg IntraVENous Stopped 3/16/23 0126)   lacosamide (VIMPAT) 200 mg in sodium chloride 0.9 % 70 mL IVPB (0 mg IntraVENous Stopped 3/16/23 0244)            None    Medical Decision Making     SCREENING TOOLS:  None      DDX: Breakthrough seizures vs     DISCUSSION:  This appears to be a moderate condition. 23 y.o. female  In evaluation of the above differential diagnosis, consideration was given to the following tests and treatments. The decision to perform testing and results were discussed with the patient and mother. I discussed each of these tests and considerations with the patient and spouse. They agree with the plan of discharge. ADDITIONAL CONSIDERATIONS:  None    Critical Care Time:     None    Yvon Cole MD  Diagnosis and Disposition     Seizures    This patient has had a really difficult time through her last few years with recurrent seizures. She has had brain surgery at [facility] in [place] 5 years ago, and is on multiple medications for seizures. Today, she missed 2 of her medications because the pharmacy did not have the medicine available. She had a 2.5 mg of midazolam given by EMT, and while she was here she had two 30 second seizures, which were treated with 2 mg of lorazepam IV. She was also given 500 mg of IV Keppra and 200 mg of her lacosamide IV.   After 2 hours of no seizures, she was given another 100 mg of lacosamide orally and discharged to home. Her mother is certain that the pharmacies will have the 2 medications that she missed today. Ramiro Olivares MD am the primary clinician of record. Dieudonne Disclaimer     Please note that this dictation was completed with [employer], the computer voice recognition software. Quite often unanticipated grammatical, syntax, homophones, and other interpretive errors are inadvertently transcribed by the computer software. Please disregard these errors. Please excuse any errors that have escaped final proofreading.     Darshan Herrera MD  (Electronically signed)             Darshan Herrera MD  03/16/23 MD Dodie  03/19/23 4541

## 2023-03-18 LAB — LEVETIRACETAM SERPL-MCNC: 76.4 UG/ML (ref 10–40)

## 2023-05-25 NOTE — ED ADULT NURSE NOTE - CAS TRG GEN SKIN CONDITION
Warm Ftsg Text: The defect edges were debeveled with a #15 scalpel blade.  Given the location of the defect, shape of the defect and the proximity to free margins a full thickness skin graft was deemed most appropriate.  Using a sterile surgical marker, the primary defect shape was transferred to the donor site. The area thus outlined was incised deep to adipose tissue with a #15 scalpel blade.  The harvested graft was then trimmed of adipose tissue until only dermis and epidermis was left.  The skin margins of the secondary defect were undermined to an appropriate distance in all directions utilizing iris scissors.  The secondary defect was closed with interrupted buried subcutaneous sutures.  The skin edges were then re-apposed with running  sutures.  The skin graft was then placed in the primary defect and oriented appropriately.

## 2023-08-29 NOTE — ED ADULT TRIAGE NOTE - INTERNATIONAL TRAVEL
Unable to Assess 5-Fu Counseling: 5-Fluorouracil Counseling:  I discussed with the patient the risks of 5-fluorouracil including but not limited to erythema, scaling, itching, weeping, crusting, and pain.

## 2023-12-09 ENCOUNTER — APPOINTMENT (OUTPATIENT)
Facility: HOSPITAL | Age: 23
End: 2023-12-09
Payer: MEDICAID

## 2023-12-09 ENCOUNTER — HOSPITAL ENCOUNTER (EMERGENCY)
Facility: HOSPITAL | Age: 23
Discharge: HOME | End: 2023-12-09
Attending: STUDENT IN AN ORGANIZED HEALTH CARE EDUCATION/TRAINING PROGRAM
Payer: MEDICAID

## 2023-12-09 VITALS
WEIGHT: 185 LBS | RESPIRATION RATE: 15 BRPM | BODY MASS INDEX: 27.4 KG/M2 | OXYGEN SATURATION: 100 % | SYSTOLIC BLOOD PRESSURE: 93 MMHG | HEIGHT: 69 IN | HEART RATE: 76 BPM | TEMPERATURE: 97.8 F | DIASTOLIC BLOOD PRESSURE: 51 MMHG

## 2023-12-09 DIAGNOSIS — G40.919 BREAKTHROUGH SEIZURE (HCC): Primary | ICD-10-CM

## 2023-12-09 LAB
ALBUMIN SERPL-MCNC: 3.8 G/DL (ref 3.4–5)
ALBUMIN/GLOB SERPL: 0.8 (ref 0.8–1.7)
ALP SERPL-CCNC: 64 U/L (ref 45–117)
ALT SERPL-CCNC: 35 U/L (ref 13–56)
ANION GAP SERPL CALC-SCNC: 4 MMOL/L (ref 3–18)
APPEARANCE UR: NORMAL
AST SERPL-CCNC: 19 U/L (ref 10–38)
BILIRUB SERPL-MCNC: 0.2 MG/DL (ref 0.2–1)
BILIRUB UR QL: NEGATIVE
BUN SERPL-MCNC: 11 MG/DL (ref 7–18)
BUN/CREAT SERPL: 12 (ref 12–20)
CALCIUM SERPL-MCNC: 9.4 MG/DL (ref 8.5–10.1)
CHLORIDE SERPL-SCNC: 112 MMOL/L (ref 100–111)
CO2 SERPL-SCNC: 24 MMOL/L (ref 21–32)
COLOR UR: YELLOW
CREAT SERPL-MCNC: 0.93 MG/DL (ref 0.6–1.3)
EKG ATRIAL RATE: 79 BPM
EKG DIAGNOSIS: NORMAL
EKG P AXIS: 55 DEGREES
EKG P-R INTERVAL: 194 MS
EKG Q-T INTERVAL: 384 MS
EKG QRS DURATION: 90 MS
EKG QTC CALCULATION (BAZETT): 440 MS
EKG R AXIS: 39 DEGREES
EKG T AXIS: 41 DEGREES
EKG VENTRICULAR RATE: 79 BPM
ERYTHROCYTE [DISTWIDTH] IN BLOOD BY AUTOMATED COUNT: 12.1 % (ref 11.6–14.5)
GLOBULIN SER CALC-MCNC: 4.5 G/DL (ref 2–4)
GLUCOSE SERPL-MCNC: 80 MG/DL (ref 74–99)
GLUCOSE UR STRIP.AUTO-MCNC: NEGATIVE MG/DL
HCG UR QL: NEGATIVE
HCT VFR BLD AUTO: 39.5 % (ref 35–45)
HGB BLD-MCNC: 13 G/DL (ref 12–16)
HGB UR QL STRIP: NEGATIVE
KETONES UR QL STRIP.AUTO: NEGATIVE MG/DL
LEUKOCYTE ESTERASE UR QL STRIP.AUTO: NEGATIVE
MAGNESIUM SERPL-MCNC: 2.4 MG/DL (ref 1.6–2.6)
MCH RBC QN AUTO: 30.2 PG (ref 24–34)
MCHC RBC AUTO-ENTMCNC: 32.9 G/DL (ref 31–37)
MCV RBC AUTO: 91.9 FL (ref 78–100)
NITRITE UR QL STRIP.AUTO: NEGATIVE
NRBC # BLD: 0 K/UL (ref 0–0.01)
NRBC BLD-RTO: 0 PER 100 WBC
PH UR STRIP: 7.5 (ref 5–8)
PLATELET # BLD AUTO: 273 K/UL (ref 135–420)
PMV BLD AUTO: 10.4 FL (ref 9.2–11.8)
POTASSIUM SERPL-SCNC: 3.8 MMOL/L (ref 3.5–5.5)
PROT SERPL-MCNC: 8.3 G/DL (ref 6.4–8.2)
PROT UR STRIP-MCNC: NEGATIVE MG/DL
RBC # BLD AUTO: 4.3 M/UL (ref 4.2–5.3)
SODIUM SERPL-SCNC: 140 MMOL/L (ref 136–145)
SP GR UR REFRACTOMETRY: 1.01 (ref 1–1.03)
UROBILINOGEN UR QL STRIP.AUTO: 1 EU/DL (ref 0.2–1)
WBC # BLD AUTO: 6.3 K/UL (ref 4.6–13.2)

## 2023-12-09 PROCEDURE — 85027 COMPLETE CBC AUTOMATED: CPT

## 2023-12-09 PROCEDURE — 81003 URINALYSIS AUTO W/O SCOPE: CPT

## 2023-12-09 PROCEDURE — 83735 ASSAY OF MAGNESIUM: CPT

## 2023-12-09 PROCEDURE — 81025 URINE PREGNANCY TEST: CPT

## 2023-12-09 PROCEDURE — 80203 DRUG SCREEN QUANT ZONISAMIDE: CPT

## 2023-12-09 PROCEDURE — 99284 EMERGENCY DEPT VISIT MOD MDM: CPT

## 2023-12-09 PROCEDURE — 6370000000 HC RX 637 (ALT 250 FOR IP): Performed by: STUDENT IN AN ORGANIZED HEALTH CARE EDUCATION/TRAINING PROGRAM

## 2023-12-09 PROCEDURE — 80053 COMPREHEN METABOLIC PANEL: CPT

## 2023-12-09 PROCEDURE — 93005 ELECTROCARDIOGRAM TRACING: CPT | Performed by: STUDENT IN AN ORGANIZED HEALTH CARE EDUCATION/TRAINING PROGRAM

## 2023-12-09 PROCEDURE — 80177 DRUG SCRN QUAN LEVETIRACETAM: CPT

## 2023-12-09 PROCEDURE — 80235 DRUG ASSAY LACOSAMIDE: CPT

## 2023-12-09 RX ORDER — LEVETIRACETAM 500 MG/1
1000 TABLET ORAL
Status: COMPLETED | OUTPATIENT
Start: 2023-12-09 | End: 2023-12-09

## 2023-12-09 RX ORDER — LACOSAMIDE 100 MG/1
100 TABLET ORAL ONCE
Status: COMPLETED | OUTPATIENT
Start: 2023-12-09 | End: 2023-12-09

## 2023-12-09 RX ORDER — CLONAZEPAM 0.5 MG/1
2 TABLET ORAL ONCE
Status: COMPLETED | OUTPATIENT
Start: 2023-12-09 | End: 2023-12-09

## 2023-12-09 RX ORDER — ZONISAMIDE 100 MG/1
300 CAPSULE ORAL ONCE
Status: COMPLETED | OUTPATIENT
Start: 2023-12-09 | End: 2023-12-09

## 2023-12-09 RX ADMIN — CLONAZEPAM 2 MG: 0.5 TABLET ORAL at 19:52

## 2023-12-09 RX ADMIN — LEVETIRACETAM 1000 MG: 500 TABLET, FILM COATED ORAL at 19:00

## 2023-12-09 RX ADMIN — LACOSAMIDE 100 MG: 100 TABLET, FILM COATED ORAL at 19:00

## 2023-12-09 RX ADMIN — ZONISAMIDE 300 MG: 100 CAPSULE ORAL at 19:01

## 2023-12-09 ASSESSMENT — PAIN DESCRIPTION - LOCATION: LOCATION: HEAD

## 2023-12-09 ASSESSMENT — PAIN SCALES - GENERAL: PAINLEVEL_OUTOF10: 6

## 2023-12-09 ASSESSMENT — PAIN - FUNCTIONAL ASSESSMENT: PAIN_FUNCTIONAL_ASSESSMENT: 0-10

## 2023-12-09 NOTE — ED TRIAGE NOTES
Patient arrives via EMS for seizures. Patient was just in the hospital for the same thing. Patient had 4 witnessed seizures lasting around 3-4 minutes. Given VERSED en route. Patient responsive to voice and pain. Unable to tell me the year.

## 2023-12-09 NOTE — ED PROVIDER NOTES
[facility] EMERGENCY DEPT  EMERGENCY DEPARTMENT ENCOUNTER    Patient Name: Sharri Cabral  MRN: [id number]  YOB: 2000  Provider: Isa Lawson DO  PCP: Chiquita, Pcp   Time/Date of evaluation: 5:12 PM EST on 12/9/23    History of Presenting Illness     History Provided by: Patient  History is limited by: Mental Status Change     HISTORY:   Sharri Cabral is a 26 y.o. female history of seizure on zonisamide, levetiracetam, lacosamide presented hospital today for seizure-like activity. Patient family reported that patient had 4 episode of seizure-like activity. Patient appears to be postictal initially    However she is able to answer history and couple minutes. Patient stated that she has been having increased stress as she is still processing the death of her uncle. Patient states that she does have seizure where she was seeing a doctor in near [facility]. She currently follows a neurologist at [facility]. She is working on obtaining new neurologist at [facility]. She denies any recent illness. However stated that she does have increased stress recently. I also discussed with patient mom who stated that patient is a complex patient with long history of epilepsy. And unfortunately she is on really high dose of antiseizure medicine. Nursing Notes were all reviewed and agreed with or any disagreements were addressed in the HPI. Past History     PAST MEDICAL HISTORY:  Past Medical History:   Diagnosis Date    Seizure disorder ([address])        PAST SURGICAL HISTORY:  No past surgical history on file. FAMILY HISTORY:  No family history on file. SOCIAL HISTORY:  Social History     Tobacco Use    Smoking status: Never    Smokeless tobacco: Never   Substance Use Topics    Alcohol use: Never    Drug use: Never       MEDICATIONS:  No current facility-administered medications for this encounter.      Current Outpatient Medications   Medication Sig Dispense Refill    lacosamide (VIMPAT) 100 MG TABS of Keppra, lacosamide and zonisamide. Patient also requesting her home clonazepam to which was provided for her. I have discussed this with Dr. Chantel Maher neurologist on-call. He stated that as long as patient is back to her baseline and does not appear to be postictal she is stable for discharge. He stated that patient is on high dose of Keppra lacosamide and zinosamide at this time. He recommends outpatient EEG for her breakthrough seizures. I have discussed this plan with patient's boyfriend patient and her mom. I have expressed my concern of her recurrent seizure. However at this time patient is back to her baseline. I have explained the neurologist reasoning at this time. They are understandable to the plan. Patient states she does not want to stay in the hospital here. She would like to be discharged home. Return precaution or return to the ER if she has seizures out of the ordinary for her including if she had multiple seizures without any recovery of her baseline mental status. Or if she have a prolonged seizure to call 911. Encouraged him to follow-up with her neurologist at [facility]. Patient and patient's mom agrees and understand with this plan           Positive and negative test results were discussed. My clinical assessment and recommendations were discussed. They agree with the plan of discharge. All questions were answered and they are in agreement with plan. RECORDS REVIEWED: Nursing Notes    Is this patient to be included in the SEP-1 core measure? No Exclusion criteria - the patient is NOT to be included for SEP-1 Core Measure due to: Infection is not suspected    MEDICATIONS ADMINISTERED IN THE ED:  Medications - No data to display         None    Critical Care: None    Diagnosis and Disposition   Breakthrough Seizure    Dragon Disclaimer     Please note that this dictation was completed with [employer], the [employer] voice recognition software.  Quite often unanticipated

## 2023-12-10 NOTE — ED NOTES
Medication given per [facility] order. Education regarding medication provided. Tolerated well with no complaints. Instructed patient to utilize the call light if he/she needs anything further. Will continue to monitor.        Ma Bosworth, RN  12/09/23 0028

## 2023-12-10 NOTE — DISCHARGE INSTRUCTIONS
Please continue to take your medications for your seizures. If your seizures is worsening and it is lasting a long time please call 911. Lab work today did not show any metabolic causes for the seizure. It is important you call your neurologist for follow up.

## 2023-12-10 NOTE — ED ADULT TRIAGE NOTE - CHIEF COMPLAINT QUOTE
per ems from home with 9 seizures today per family, lasting about 30 seconds to 1 minute each, hx of seizures, fs 98 by ems
10-Dec-2023 07:50

## 2023-12-10 NOTE — ED NOTES
Report given to oncoming RN. Patient information discussed and reviewed per facility protocol. Outstanding orders reviewed (if applicable). No applicable questions at this time. Will sign off from patient.        Neeraj Bowen RN  12/09/23 7712

## 2023-12-13 LAB
LEVETIRACETAM SERPL-MCNC: 55 UG/ML (ref 10–40)
ZONISAMIDE SERPL-MCNC: 35.3 UG/ML (ref 10–40)

## 2023-12-14 LAB — LACOSAMIDE SERPL-MCNC: 7.7 UG/ML (ref 5–10)

## 2024-07-22 NOTE — ED ADULT NURSE NOTE - NSIMPLEMENTINTERV_GEN_ALL_ED
1 = Total assistance
Implemented All Fall with Harm Risk Interventions:  [place] to call system. Call bell, personal items and telephone within reach. Instruct patient to call for assistance. Room bathroom lighting operational. Non-slip footwear when patient is off stretcher. Physically safe environment: no spills, clutter or unnecessary equipment. Stretcher in lowest position, wheels locked, appropriate side rails in place. Provide visual cue, wrist band, yellow gown, etc. Monitor gait and stability. Monitor for mental status changes and reorient to person, place, and time. Review medications for side effects contributing to fall risk. Reinforce activity limits and safety measures with patient and family. Provide visual clues: red socks.

## 2024-08-27 NOTE — ED ADULT NURSE NOTE - CHIEF COMPLAINT QUOTE
per ems from home with 9 seizures today per family, lasting about 30 seconds to 1 minute each, hx of seizures, fs 98 by ems [id number]

## 2024-11-01 NOTE — ED ADULT NURSE NOTE - NSSEPSISSUSPECTED_ED_A_ED
Length Of Topical Anesthesia Application (Optional): 15 minutes Consent: The patient's consent was obtained including but not limited to risks of crusting, scabbing, blistering, scarring, darker or lighter pigmentary change, recurrence, incomplete removal and infection. Medical Necessity Information: It is in your best interest to select a reason for this procedure from the list below. All of these items fulfill various CMS LCD requirements except the new and changing color options. Pared With?: 15 blade Show Topical Anesthesia Variable?: Yes Detail Level: Detailed Add 52 Modifier (Optional): no Duration Of Freeze Thaw-Cycle (Seconds): 5-10 Post-Care Instructions: I reviewed with the patient in detail post-care instructions. Patient is to wear sunprotection, and avoid picking at any of the treated lesions. Pt may apply Vaseline to crusted or scabbing areas. Medical Necessity Clause: This procedure was medically necessary because the lesions that were treated were: Spray Paint Text: The liquid nitrogen was applied to the skin utilizing a spray paint frosting technique. Number Of Freeze-Thaw Cycles: 2 freeze-thaw cycles No